# Patient Record
Sex: FEMALE | Race: BLACK OR AFRICAN AMERICAN | URBAN - METROPOLITAN AREA
[De-identification: names, ages, dates, MRNs, and addresses within clinical notes are randomized per-mention and may not be internally consistent; named-entity substitution may affect disease eponyms.]

---

## 2019-09-20 ENCOUNTER — INPATIENT (INPATIENT)
Facility: HOSPITAL | Age: 54
LOS: 4 days | Discharge: HOME | DRG: 418 | End: 2019-09-25
Attending: SURGERY | Admitting: SURGERY
Payer: COMMERCIAL

## 2019-09-20 VITALS — HEIGHT: 63 IN | WEIGHT: 270.07 LBS

## 2019-09-20 DIAGNOSIS — K81.0 ACUTE CHOLECYSTITIS: ICD-10-CM

## 2019-09-20 DIAGNOSIS — Z90.710 ACQUIRED ABSENCE OF BOTH CERVIX AND UTERUS: ICD-10-CM

## 2019-09-20 DIAGNOSIS — Z90.49 ACQUIRED ABSENCE OF OTHER SPECIFIED PARTS OF DIGESTIVE TRACT: Chronic | ICD-10-CM

## 2019-09-20 DIAGNOSIS — E66.9 OBESITY, UNSPECIFIED: ICD-10-CM

## 2019-09-20 DIAGNOSIS — K82.A1 GANGRENE OF GALLBLADDER IN CHOLECYSTITIS: ICD-10-CM

## 2019-09-20 DIAGNOSIS — D68.0 VON WILLEBRAND DISEASE: ICD-10-CM

## 2019-09-20 DIAGNOSIS — I10 ESSENTIAL (PRIMARY) HYPERTENSION: ICD-10-CM

## 2019-09-20 DIAGNOSIS — Z90.710 ACQUIRED ABSENCE OF BOTH CERVIX AND UTERUS: Chronic | ICD-10-CM

## 2019-09-20 LAB
ALBUMIN SERPL ELPH-MCNC: 3.9 G/DL — SIGNIFICANT CHANGE UP (ref 3.3–5)
ALP SERPL-CCNC: 78 U/L — SIGNIFICANT CHANGE UP (ref 40–120)
ALT FLD-CCNC: 18 U/L — SIGNIFICANT CHANGE UP (ref 12–78)
ANION GAP SERPL CALC-SCNC: 10 MMOL/L — SIGNIFICANT CHANGE UP (ref 5–17)
APTT BLD: 35 SEC — SIGNIFICANT CHANGE UP (ref 27.5–36.3)
AST SERPL-CCNC: 19 U/L — SIGNIFICANT CHANGE UP (ref 15–37)
BASOPHILS # BLD AUTO: 0.05 K/UL — SIGNIFICANT CHANGE UP (ref 0–0.2)
BASOPHILS NFR BLD AUTO: 0.6 % — SIGNIFICANT CHANGE UP (ref 0–2)
BILIRUB SERPL-MCNC: 0.5 MG/DL — SIGNIFICANT CHANGE UP (ref 0.2–1.2)
BUN SERPL-MCNC: 9 MG/DL — SIGNIFICANT CHANGE UP (ref 7–23)
CALCIUM SERPL-MCNC: 9.1 MG/DL — SIGNIFICANT CHANGE UP (ref 8.5–10.1)
CHLORIDE SERPL-SCNC: 100 MMOL/L — SIGNIFICANT CHANGE UP (ref 96–108)
CO2 SERPL-SCNC: 30 MMOL/L — SIGNIFICANT CHANGE UP (ref 22–31)
CREAT SERPL-MCNC: 0.83 MG/DL — SIGNIFICANT CHANGE UP (ref 0.5–1.3)
EOSINOPHIL # BLD AUTO: 0.11 K/UL — SIGNIFICANT CHANGE UP (ref 0–0.5)
EOSINOPHIL NFR BLD AUTO: 1.2 % — SIGNIFICANT CHANGE UP (ref 0–6)
GLUCOSE SERPL-MCNC: 90 MG/DL — SIGNIFICANT CHANGE UP (ref 70–99)
HCT VFR BLD CALC: 40.9 % — SIGNIFICANT CHANGE UP (ref 34.5–45)
HGB BLD-MCNC: 13.5 G/DL — SIGNIFICANT CHANGE UP (ref 11.5–15.5)
IMM GRANULOCYTES NFR BLD AUTO: 0.3 % — SIGNIFICANT CHANGE UP (ref 0–1.5)
INR BLD: 1.18 RATIO — HIGH (ref 0.88–1.16)
LIDOCAIN IGE QN: 59 U/L — LOW (ref 73–393)
LYMPHOCYTES # BLD AUTO: 1.87 K/UL — SIGNIFICANT CHANGE UP (ref 1–3.3)
LYMPHOCYTES # BLD AUTO: 21 % — SIGNIFICANT CHANGE UP (ref 13–44)
MCHC RBC-ENTMCNC: 26.6 PG — LOW (ref 27–34)
MCHC RBC-ENTMCNC: 33 GM/DL — SIGNIFICANT CHANGE UP (ref 32–36)
MCV RBC AUTO: 80.7 FL — SIGNIFICANT CHANGE UP (ref 80–100)
MONOCYTES # BLD AUTO: 0.81 K/UL — SIGNIFICANT CHANGE UP (ref 0–0.9)
MONOCYTES NFR BLD AUTO: 9.1 % — SIGNIFICANT CHANGE UP (ref 2–14)
NEUTROPHILS # BLD AUTO: 6.03 K/UL — SIGNIFICANT CHANGE UP (ref 1.8–7.4)
NEUTROPHILS NFR BLD AUTO: 67.8 % — SIGNIFICANT CHANGE UP (ref 43–77)
PLATELET # BLD AUTO: 272 K/UL — SIGNIFICANT CHANGE UP (ref 150–400)
POTASSIUM SERPL-MCNC: 3.4 MMOL/L — LOW (ref 3.5–5.3)
POTASSIUM SERPL-SCNC: 3.4 MMOL/L — LOW (ref 3.5–5.3)
PROT SERPL-MCNC: 8 GM/DL — SIGNIFICANT CHANGE UP (ref 6–8.3)
PROTHROM AB SERPL-ACNC: 13.2 SEC — HIGH (ref 10–12.9)
RBC # BLD: 5.07 M/UL — SIGNIFICANT CHANGE UP (ref 3.8–5.2)
RBC # FLD: 15 % — HIGH (ref 10.3–14.5)
SODIUM SERPL-SCNC: 140 MMOL/L — SIGNIFICANT CHANGE UP (ref 135–145)
WBC # BLD: 8.9 K/UL — SIGNIFICANT CHANGE UP (ref 3.8–10.5)
WBC # FLD AUTO: 8.9 K/UL — SIGNIFICANT CHANGE UP (ref 3.8–10.5)

## 2019-09-20 PROCEDURE — 85025 COMPLETE CBC W/AUTO DIFF WBC: CPT

## 2019-09-20 PROCEDURE — 85730 THROMBOPLASTIN TIME PARTIAL: CPT

## 2019-09-20 PROCEDURE — 85611 PROTHROMBIN TEST: CPT

## 2019-09-20 PROCEDURE — C9113: CPT

## 2019-09-20 PROCEDURE — 87075 CULTR BACTERIA EXCEPT BLOOD: CPT

## 2019-09-20 PROCEDURE — 85732 THROMBOPLASTIN TIME PARTIAL: CPT

## 2019-09-20 PROCEDURE — A9537: CPT

## 2019-09-20 PROCEDURE — 87070 CULTURE OTHR SPECIMN AEROBIC: CPT

## 2019-09-20 PROCEDURE — 85240 CLOT FACTOR VIII AHG 1 STAGE: CPT

## 2019-09-20 PROCEDURE — 94640 AIRWAY INHALATION TREATMENT: CPT

## 2019-09-20 PROCEDURE — 36415 COLL VENOUS BLD VENIPUNCTURE: CPT

## 2019-09-20 PROCEDURE — 86077 PHYS BLOOD BANK SERV XMATCH: CPT

## 2019-09-20 PROCEDURE — 83690 ASSAY OF LIPASE: CPT

## 2019-09-20 PROCEDURE — 85245 CLOT FACTOR VIII VW RISTOCTN: CPT

## 2019-09-20 PROCEDURE — C1889: CPT

## 2019-09-20 PROCEDURE — 86870 RBC ANTIBODY IDENTIFICATION: CPT

## 2019-09-20 PROCEDURE — 76705 ECHO EXAM OF ABDOMEN: CPT | Mod: 26

## 2019-09-20 PROCEDURE — 93005 ELECTROCARDIOGRAM TRACING: CPT

## 2019-09-20 PROCEDURE — 80076 HEPATIC FUNCTION PANEL: CPT

## 2019-09-20 PROCEDURE — 86905 BLOOD TYPING RBC ANTIGENS: CPT

## 2019-09-20 PROCEDURE — 85027 COMPLETE CBC AUTOMATED: CPT

## 2019-09-20 PROCEDURE — 80053 COMPREHEN METABOLIC PANEL: CPT

## 2019-09-20 PROCEDURE — 88304 TISSUE EXAM BY PATHOLOGIST: CPT

## 2019-09-20 PROCEDURE — 86803 HEPATITIS C AB TEST: CPT

## 2019-09-20 PROCEDURE — 99223 1ST HOSP IP/OBS HIGH 75: CPT

## 2019-09-20 PROCEDURE — 78226 HEPATOBILIARY SYSTEM IMAGING: CPT

## 2019-09-20 PROCEDURE — 85247 CLOT FACTOR VIII MULTIMETRIC: CPT

## 2019-09-20 PROCEDURE — 85610 PROTHROMBIN TIME: CPT

## 2019-09-20 PROCEDURE — 85670 THROMBIN TIME PLASMA: CPT

## 2019-09-20 PROCEDURE — 80048 BASIC METABOLIC PNL TOTAL CA: CPT

## 2019-09-20 PROCEDURE — 85246 CLOT FACTOR VIII VW ANTIGEN: CPT

## 2019-09-20 RX ORDER — MORPHINE SULFATE 50 MG/1
4 CAPSULE, EXTENDED RELEASE ORAL ONCE
Refills: 0 | Status: DISCONTINUED | OUTPATIENT
Start: 2019-09-20 | End: 2019-09-20

## 2019-09-20 RX ORDER — PIPERACILLIN AND TAZOBACTAM 4; .5 G/20ML; G/20ML
3.38 INJECTION, POWDER, LYOPHILIZED, FOR SOLUTION INTRAVENOUS EVERY 8 HOURS
Refills: 0 | Status: DISCONTINUED | OUTPATIENT
Start: 2019-09-20 | End: 2019-09-25

## 2019-09-20 RX ORDER — ALBUTEROL 90 UG/1
2 AEROSOL, METERED ORAL EVERY 6 HOURS
Refills: 0 | Status: DISCONTINUED | OUTPATIENT
Start: 2019-09-20 | End: 2019-09-25

## 2019-09-20 RX ORDER — ACETAMINOPHEN 500 MG
650 TABLET ORAL EVERY 6 HOURS
Refills: 0 | Status: DISCONTINUED | OUTPATIENT
Start: 2019-09-20 | End: 2019-09-23

## 2019-09-20 RX ORDER — ENALAPRIL MALEATE AND HYDROCHLOROTHIAZIDE 10; 25 MG/1; MG/1
1 TABLET ORAL
Qty: 0 | Refills: 0 | DISCHARGE

## 2019-09-20 RX ORDER — PANTOPRAZOLE SODIUM 20 MG/1
40 TABLET, DELAYED RELEASE ORAL DAILY
Refills: 0 | Status: DISCONTINUED | OUTPATIENT
Start: 2019-09-20 | End: 2019-09-24

## 2019-09-20 RX ORDER — PIPERACILLIN AND TAZOBACTAM 4; .5 G/20ML; G/20ML
3.38 INJECTION, POWDER, LYOPHILIZED, FOR SOLUTION INTRAVENOUS ONCE
Refills: 0 | Status: COMPLETED | OUTPATIENT
Start: 2019-09-20 | End: 2019-09-20

## 2019-09-20 RX ORDER — POTASSIUM CHLORIDE 20 MEQ
40 PACKET (EA) ORAL ONCE
Refills: 0 | Status: COMPLETED | OUTPATIENT
Start: 2019-09-20 | End: 2019-09-20

## 2019-09-20 RX ORDER — INFLUENZA VIRUS VACCINE 15; 15; 15; 15 UG/.5ML; UG/.5ML; UG/.5ML; UG/.5ML
0.5 SUSPENSION INTRAMUSCULAR ONCE
Refills: 0 | Status: COMPLETED | OUTPATIENT
Start: 2019-09-20 | End: 2019-09-20

## 2019-09-20 RX ORDER — HYDROMORPHONE HYDROCHLORIDE 2 MG/ML
1 INJECTION INTRAMUSCULAR; INTRAVENOUS; SUBCUTANEOUS EVERY 4 HOURS
Refills: 0 | Status: DISCONTINUED | OUTPATIENT
Start: 2019-09-20 | End: 2019-09-24

## 2019-09-20 RX ORDER — OXYCODONE AND ACETAMINOPHEN 5; 325 MG/1; MG/1
1 TABLET ORAL EVERY 4 HOURS
Refills: 0 | Status: DISCONTINUED | OUTPATIENT
Start: 2019-09-20 | End: 2019-09-20

## 2019-09-20 RX ORDER — SODIUM CHLORIDE 9 MG/ML
1000 INJECTION INTRAMUSCULAR; INTRAVENOUS; SUBCUTANEOUS
Refills: 0 | Status: DISCONTINUED | OUTPATIENT
Start: 2019-09-20 | End: 2019-09-22

## 2019-09-20 RX ORDER — SODIUM CHLORIDE 9 MG/ML
1000 INJECTION INTRAMUSCULAR; INTRAVENOUS; SUBCUTANEOUS ONCE
Refills: 0 | Status: COMPLETED | OUTPATIENT
Start: 2019-09-20 | End: 2019-09-20

## 2019-09-20 RX ORDER — ONDANSETRON 8 MG/1
4 TABLET, FILM COATED ORAL ONCE
Refills: 0 | Status: COMPLETED | OUTPATIENT
Start: 2019-09-20 | End: 2019-09-20

## 2019-09-20 RX ORDER — OXYCODONE HYDROCHLORIDE 5 MG/1
5 TABLET ORAL EVERY 6 HOURS
Refills: 0 | Status: DISCONTINUED | OUTPATIENT
Start: 2019-09-20 | End: 2019-09-23

## 2019-09-20 RX ORDER — ONDANSETRON 8 MG/1
4 TABLET, FILM COATED ORAL EVERY 6 HOURS
Refills: 0 | Status: DISCONTINUED | OUTPATIENT
Start: 2019-09-20 | End: 2019-09-25

## 2019-09-20 RX ORDER — MORPHINE SULFATE 50 MG/1
2 CAPSULE, EXTENDED RELEASE ORAL EVERY 4 HOURS
Refills: 0 | Status: DISCONTINUED | OUTPATIENT
Start: 2019-09-20 | End: 2019-09-20

## 2019-09-20 RX ADMIN — OXYCODONE HYDROCHLORIDE 5 MILLIGRAM(S): 5 TABLET ORAL at 20:25

## 2019-09-20 RX ADMIN — MORPHINE SULFATE 4 MILLIGRAM(S): 50 CAPSULE, EXTENDED RELEASE ORAL at 20:14

## 2019-09-20 RX ADMIN — MORPHINE SULFATE 4 MILLIGRAM(S): 50 CAPSULE, EXTENDED RELEASE ORAL at 17:14

## 2019-09-20 RX ADMIN — SODIUM CHLORIDE 1000 MILLILITER(S): 9 INJECTION INTRAMUSCULAR; INTRAVENOUS; SUBCUTANEOUS at 17:14

## 2019-09-20 RX ADMIN — ONDANSETRON 4 MILLIGRAM(S): 8 TABLET, FILM COATED ORAL at 17:15

## 2019-09-20 RX ADMIN — SODIUM CHLORIDE 100 MILLILITER(S): 9 INJECTION INTRAMUSCULAR; INTRAVENOUS; SUBCUTANEOUS at 20:21

## 2019-09-20 RX ADMIN — Medication 40 MILLIEQUIVALENT(S): at 20:21

## 2019-09-20 RX ADMIN — PIPERACILLIN AND TAZOBACTAM 200 GRAM(S): 4; .5 INJECTION, POWDER, LYOPHILIZED, FOR SOLUTION INTRAVENOUS at 20:21

## 2019-09-20 NOTE — H&P ADULT - HISTORY OF PRESENT ILLNESS
Pt seen and examined at bedside with chaperone. Pt is AAOx3, pt in no acute distress. Pt has c/o subjective fever on 9/16/19, with subsequent development of upper abd pain on 9/18/19. Pain is intermittent, sharp, non radiating, associated with nausea and non bloody emesis. Pt denied c/o fever, chills, chest pain, SOB, extremity pain or dysfunction, hemoptysis, hematemesis, hematuria, hematochexia, headache, diplopia, vertigo, dizzyness. Pt states tolerating diet, (+) void, (+) ambulation, (+) bowel function

## 2019-09-20 NOTE — ED ADULT NURSE NOTE - OBJECTIVE STATEMENT
presents to ed with abdominal pain beginning wednesday and vomiting on wednesday. patient c/o fevers on monday that have subsided. patient has not eaten or drank anything.

## 2019-09-20 NOTE — H&P ADULT - ASSESSMENT
HIDA scan  Hematology consult A/P:  R/O acute cholecystitis  Medical comorbidities of HTN, von Willebrand disease  Antibiotics  NPO, IV hydration  GI/DVT prophylaxis  Pain control  Incentive spirometry  Serial abd exams  F/U labs, HIDA  Pt will be monitored for signs of evolution/resolution of pathology and surgical intervention as required and warranted  Pt aware of and agrees with all of the above    Hospitalist consult for medical management/clearance  Hematology consult for vWD treatment modality for eventuality of surgery

## 2019-09-20 NOTE — ED STATDOCS - PROGRESS NOTE DETAILS
Pt is a 55 y/o F, with PMHx of HTN, s/p hysterectomy, presenting to the ED with c/o abdominal pain for the last three days. Pt reports mid epigastric abd pain and right upper abd pain that has been intermittent but worsening since onset, and worse with deep breath. She reports experiencing a "heat wave" three days ago where she felt flushed and began having multiple episodes of NBNB emesis. Denies documented fever, diarrhea, CP, SOB, and urinary sxs. No sick contact. Pt went to urgent care regarding sxs and sent to ED further eval.  PE: (+) RUQ pain and epigastric tenderness plan: ivfs, labs, pain meds, imaging and reeval. -Melinda White PA-C spoke to surgery they will consult in ed. pt aware of labs and imaging results. -Melinda White PA-C spoke to surgery resident will admit for HIDA tommorrow. pt aware and agrees with plan. -Melinda White PA-C

## 2019-09-20 NOTE — ED STATDOCS - CLINICAL SUMMARY MEDICAL DECISION MAKING FREE TEXT BOX
53 yo F with RUQ abd pain and vomiting for 3-4 days. Subjective fever at home. Rule out acute cholecystitis.

## 2019-09-20 NOTE — ED STATDOCS - NS_ ATTENDINGSCRIBEDETAILS _ED_A_ED_FT
Skylar Hercules MD: The history, relevant review of systems, past medical and surgical history, medical decision making, and physical examination was documented by the scribe in my presence and I attest to the accuracy of the documentation.

## 2019-09-20 NOTE — H&P ADULT - NSHPLABSRESULTS_GEN_ALL_CORE
13.5   8.90  )-----------( 272      ( 20 Sep 2019 17:11 )             40.9     CBC Full  -  ( 20 Sep 2019 17:11 )  WBC Count : 8.90 K/uL  RBC Count : 5.07 M/uL  Hemoglobin : 13.5 g/dL  Hematocrit : 40.9 %  Platelet Count - Automated : 272 K/uL  Mean Cell Volume : 80.7 fl  Mean Cell Hemoglobin : 26.6 pg  Mean Cell Hemoglobin Concentration : 33.0 gm/dL  Auto Neutrophil # : 6.03 K/uL  Auto Lymphocyte # : 1.87 K/uL  Auto Monocyte # : 0.81 K/uL  Auto Eosinophil # : 0.11 K/uL  Auto Basophil # : 0.05 K/uL  Auto Neutrophil % : 67.8 %  Auto Lymphocyte % : 21.0 %  Auto Monocyte % : 9.1 %  Auto Eosinophil % : 1.2 %  Auto Basophil % : 0.6 %    09-20    140  |  100  |  9   ----------------------------<  90  3.4<L>   |  30  |  0.83    Ca    9.1      20 Sep 2019 17:11    TPro  8.0  /  Alb  3.9  /  TBili  0.5  /  DBili  x   /  AST  19  /  ALT  18  /  AlkPhos  78  09-20    LIVER FUNCTIONS - ( 20 Sep 2019 17:11 )  Alb: 3.9 g/dL / Pro: 8.0 gm/dL / ALK PHOS: 78 U/L / ALT: 18 U/L / AST: 19 U/L / GGT: x           PT/INR - ( 20 Sep 2019 17:11 )   PT: 13.2 sec;   INR: 1.18 ratio         PTT - ( 20 Sep 2019 17:11 )  PTT:35.0 sec    Radiology:    Pending HIDA    EXAM:  US ABDOMEN LIMITED                          PROCEDURE DATE:  09/20/2019      INTERPRETATION:  CLINICAL INFORMATION: Right upper quadrant abdominal pain    COMPARISON: None available.    IMPRESSION:     Findings suspicious for acute cholecystitis

## 2019-09-20 NOTE — ED STATDOCS - OBJECTIVE STATEMENT
Pt is a 55 y/o F, with PMHx of HTN, s/p hysterectomy, presenting to the ED with c/o abdominal pain for the last three days. Pt reports mid epigastric abd pain and right upper abd pain that has been intermittent but worsening since onset, and worse with deep breath. She reports experiencing a "heat wave" three days ago where she felt flushed and began having multiple episodes of NBNB emesis. Denies documented fever, diarrhea, CP, SOB, and urinary sxs. No sick contact. Pt went to urgent care regarding sxs and sent to ED further eval.   Dora- PMD

## 2019-09-20 NOTE — H&P ADULT - NSHPPHYSICALEXAM_GEN_ALL_CORE
Pt is AAOx3  Pt in no acute distress  Neuro: CNII-XII grossly intact   Psych: normal affect  HEENT: Normocephalic, atraumatic, CHELSEA, EOM wnl  Neck: No crepitus, no ecchymosis, no hematoma, to exam, no JVD, no tracheal deviation  Cardiovascular: S1S2 Present  Respiratory: Respiratory Effort normal; no wheezes, rales or rhonchi to exam, CTAB  ABD: obese habitus, bowel sounds (+), soft, non distended, no rebound, no guarding, no rigidity, no skin changes to exam. (+) right upper quadrant and epigastric tenderness to exam  Musculoskeletal: All digits are warm and well perfused. Pt demonstrates grossly intact sensoromotor function. Pt has good capillary refill to digits, no calf edema or tenderness to exam.  Skin: no jaundice or icteric sclera to exam b/l, no skin changes to exam    Vital Signs Last 24 Hrs  T(C): 37.4 (20 Sep 2019 20:13), Max: 37.4 (20 Sep 2019 20:13)  T(F): 99.4 (20 Sep 2019 20:13), Max: 99.4 (20 Sep 2019 20:13)  HR: 83 (20 Sep 2019 20:13) (82 - 83)  BP: 123/73 (20 Sep 2019 20:13) (123/73 - 130/83)  BP(mean): --  RR: 16 (20 Sep 2019 20:13) (16 - 17)  SpO2: 99% (20 Sep 2019 20:13) (96% - 99%)

## 2019-09-21 DIAGNOSIS — D68.0 VON WILLEBRAND DISEASE: ICD-10-CM

## 2019-09-21 LAB
ALBUMIN SERPL ELPH-MCNC: 3.1 G/DL — LOW (ref 3.3–5)
ALP SERPL-CCNC: 62 U/L — SIGNIFICANT CHANGE UP (ref 40–120)
ALT FLD-CCNC: 15 U/L — SIGNIFICANT CHANGE UP (ref 12–78)
ANION GAP SERPL CALC-SCNC: 5 MMOL/L — SIGNIFICANT CHANGE UP (ref 5–17)
APTT BLD: 32.9 SEC — SIGNIFICANT CHANGE UP (ref 27.5–36.3)
AST SERPL-CCNC: 10 U/L — LOW (ref 15–37)
BASOPHILS # BLD AUTO: 0.03 K/UL — SIGNIFICANT CHANGE UP (ref 0–0.2)
BASOPHILS NFR BLD AUTO: 0.4 % — SIGNIFICANT CHANGE UP (ref 0–2)
BILIRUB SERPL-MCNC: 0.6 MG/DL — SIGNIFICANT CHANGE UP (ref 0.2–1.2)
BUN SERPL-MCNC: 9 MG/DL — SIGNIFICANT CHANGE UP (ref 7–23)
CALCIUM SERPL-MCNC: 8.4 MG/DL — LOW (ref 8.5–10.1)
CHLORIDE SERPL-SCNC: 108 MMOL/L — SIGNIFICANT CHANGE UP (ref 96–108)
CO2 SERPL-SCNC: 29 MMOL/L — SIGNIFICANT CHANGE UP (ref 22–31)
CREAT SERPL-MCNC: 0.71 MG/DL — SIGNIFICANT CHANGE UP (ref 0.5–1.3)
EOSINOPHIL # BLD AUTO: 0.12 K/UL — SIGNIFICANT CHANGE UP (ref 0–0.5)
EOSINOPHIL NFR BLD AUTO: 1.5 % — SIGNIFICANT CHANGE UP (ref 0–6)
GLUCOSE SERPL-MCNC: 91 MG/DL — SIGNIFICANT CHANGE UP (ref 70–99)
HCT VFR BLD CALC: 34.7 % — SIGNIFICANT CHANGE UP (ref 34.5–45)
HCV AB S/CO SERPL IA: 0.19 S/CO — SIGNIFICANT CHANGE UP (ref 0–0.99)
HCV AB SERPL-IMP: SIGNIFICANT CHANGE UP
HGB BLD-MCNC: 11.6 G/DL — SIGNIFICANT CHANGE UP (ref 11.5–15.5)
IMM GRANULOCYTES NFR BLD AUTO: 0.4 % — SIGNIFICANT CHANGE UP (ref 0–1.5)
INR BLD: 1.24 RATIO — HIGH (ref 0.88–1.16)
INR BLD: 1.3 RATIO — HIGH (ref 0.88–1.16)
LYMPHOCYTES # BLD AUTO: 1.76 K/UL — SIGNIFICANT CHANGE UP (ref 1–3.3)
LYMPHOCYTES # BLD AUTO: 22.4 % — SIGNIFICANT CHANGE UP (ref 13–44)
MCHC RBC-ENTMCNC: 27 PG — SIGNIFICANT CHANGE UP (ref 27–34)
MCHC RBC-ENTMCNC: 33.4 GM/DL — SIGNIFICANT CHANGE UP (ref 32–36)
MCV RBC AUTO: 80.9 FL — SIGNIFICANT CHANGE UP (ref 80–100)
MONOCYTES # BLD AUTO: 0.84 K/UL — SIGNIFICANT CHANGE UP (ref 0–0.9)
MONOCYTES NFR BLD AUTO: 10.7 % — SIGNIFICANT CHANGE UP (ref 2–14)
NEUTROPHILS # BLD AUTO: 5.06 K/UL — SIGNIFICANT CHANGE UP (ref 1.8–7.4)
NEUTROPHILS NFR BLD AUTO: 64.6 % — SIGNIFICANT CHANGE UP (ref 43–77)
PLATELET # BLD AUTO: 212 K/UL — SIGNIFICANT CHANGE UP (ref 150–400)
POTASSIUM SERPL-MCNC: 3.3 MMOL/L — LOW (ref 3.5–5.3)
POTASSIUM SERPL-SCNC: 3.3 MMOL/L — LOW (ref 3.5–5.3)
PROT SERPL-MCNC: 6.5 GM/DL — SIGNIFICANT CHANGE UP (ref 6–8.3)
PROTHROM AB SERPL-ACNC: 13.8 SEC — HIGH (ref 10–12.9)
PROTHROM AB SERPL-ACNC: 14.6 SEC — HIGH (ref 10–12.9)
RBC # BLD: 4.29 M/UL — SIGNIFICANT CHANGE UP (ref 3.8–5.2)
RBC # FLD: 14.9 % — HIGH (ref 10.3–14.5)
SODIUM SERPL-SCNC: 142 MMOL/L — SIGNIFICANT CHANGE UP (ref 135–145)
WBC # BLD: 7.84 K/UL — SIGNIFICANT CHANGE UP (ref 3.8–10.5)
WBC # FLD AUTO: 7.84 K/UL — SIGNIFICANT CHANGE UP (ref 3.8–10.5)

## 2019-09-21 PROCEDURE — 93010 ELECTROCARDIOGRAM REPORT: CPT

## 2019-09-21 PROCEDURE — 78226 HEPATOBILIARY SYSTEM IMAGING: CPT | Mod: 26

## 2019-09-21 PROCEDURE — 99254 IP/OBS CNSLTJ NEW/EST MOD 60: CPT

## 2019-09-21 PROCEDURE — 99232 SBSQ HOSP IP/OBS MODERATE 35: CPT

## 2019-09-21 RX ORDER — POTASSIUM CHLORIDE 20 MEQ
20 PACKET (EA) ORAL
Refills: 0 | Status: COMPLETED | OUTPATIENT
Start: 2019-09-21 | End: 2019-09-21

## 2019-09-21 RX ORDER — MORPHINE SULFATE 50 MG/1
4 CAPSULE, EXTENDED RELEASE ORAL ONCE
Refills: 0 | Status: DISCONTINUED | OUTPATIENT
Start: 2019-09-21 | End: 2019-09-21

## 2019-09-21 RX ADMIN — Medication 20 MILLIEQUIVALENT(S): at 16:45

## 2019-09-21 RX ADMIN — Medication 20 MILLIEQUIVALENT(S): at 18:05

## 2019-09-21 RX ADMIN — PIPERACILLIN AND TAZOBACTAM 25 GRAM(S): 4; .5 INJECTION, POWDER, LYOPHILIZED, FOR SOLUTION INTRAVENOUS at 21:19

## 2019-09-21 RX ADMIN — MORPHINE SULFATE 4 MILLIGRAM(S): 50 CAPSULE, EXTENDED RELEASE ORAL at 11:15

## 2019-09-21 RX ADMIN — OXYCODONE HYDROCHLORIDE 5 MILLIGRAM(S): 5 TABLET ORAL at 20:15

## 2019-09-21 RX ADMIN — PANTOPRAZOLE SODIUM 40 MILLIGRAM(S): 20 TABLET, DELAYED RELEASE ORAL at 08:15

## 2019-09-21 RX ADMIN — PIPERACILLIN AND TAZOBACTAM 25 GRAM(S): 4; .5 INJECTION, POWDER, LYOPHILIZED, FOR SOLUTION INTRAVENOUS at 13:09

## 2019-09-21 RX ADMIN — SODIUM CHLORIDE 100 MILLILITER(S): 9 INJECTION INTRAMUSCULAR; INTRAVENOUS; SUBCUTANEOUS at 15:50

## 2019-09-21 RX ADMIN — MORPHINE SULFATE 4 MILLIGRAM(S): 50 CAPSULE, EXTENDED RELEASE ORAL at 11:01

## 2019-09-21 RX ADMIN — OXYCODONE HYDROCHLORIDE 5 MILLIGRAM(S): 5 TABLET ORAL at 08:45

## 2019-09-21 RX ADMIN — Medication 650 MILLIGRAM(S): at 22:04

## 2019-09-21 RX ADMIN — OXYCODONE HYDROCHLORIDE 5 MILLIGRAM(S): 5 TABLET ORAL at 21:04

## 2019-09-21 RX ADMIN — Medication 650 MILLIGRAM(S): at 21:19

## 2019-09-21 RX ADMIN — OXYCODONE HYDROCHLORIDE 5 MILLIGRAM(S): 5 TABLET ORAL at 08:15

## 2019-09-21 RX ADMIN — SODIUM CHLORIDE 100 MILLILITER(S): 9 INJECTION INTRAMUSCULAR; INTRAVENOUS; SUBCUTANEOUS at 05:06

## 2019-09-21 RX ADMIN — PIPERACILLIN AND TAZOBACTAM 25 GRAM(S): 4; .5 INJECTION, POWDER, LYOPHILIZED, FOR SOLUTION INTRAVENOUS at 05:06

## 2019-09-21 NOTE — CONSULT NOTE ADULT - SUBJECTIVE AND OBJECTIVE BOX
hpi: 54y female w/ pmh htn admitted with abdominal pain. Patient on surgery service, and plan for possible cholecystectomy.   Hematology consulted as patient reports history of von willibrands disease.   On further talking to patient she states that she was diagnosed with von willibrands disease about 7 years ago. Work up was actually initiated at the time because her son excessive nose bleeds and was diagnosed with vWD.  She had a few surgeries prior to diagnosis ( fibroid removal as well as some breast surgeries) with no excessive bleeding.   Post diagnosis of vwd she had a hysterectomy and was treated with 'something' but doesnt quite remember what.   Pateints medical care has been at [facility] in [place].   She has a h/o heavy menstrual bleeding  Patient has a h/o 2 blood transfusions due to heavy periods. No h/o transfusions related to surgeries  Currently patient denies chest pain , sob, palp- no hx CAD, no DM.  Able to ambulate w/o cp or sob.     PAST MEDICAL & SURGICAL HISTORY:  htn, ?von willebrand disease  KAREN, appendectomy  Fibroids, Breast surgery    FAMILY HISTORY: son with vWD  Social History: no etoh or tobacco use

## 2019-09-21 NOTE — CHART NOTE - NSCHARTNOTEFT_GEN_A_CORE
Patient with h/o von WIllibrands Disease. Unclear of von willibrands profile/ records which are not in the system   Order von Willibrands antigen, von willibrands activity, von Willibrands Multimer, Factor 8 activity level  Spoke to surgery resident (Licha) about the same.   Will follow up levels and provide recommendations

## 2019-09-21 NOTE — CONSULT NOTE ADULT - ASSESSMENT
Medical comorbidities of HTN, von Willebrand disease  Antibiotics  NPO, IV hydration  GI/DVT prophylaxis  Pain control  Incentive spirometry  Serial abd exams  F/U labs, HIDA  Pt will be monitored for signs of evolution/resolution of pathology and surgical intervention as required and warranted  Pt aware of and agrees with all of the above    Hospitalist consult for medical management/clearance  Hematology consult for vWD treatment modality for eventuality of surgery

## 2019-09-21 NOTE — CONSULT NOTE ADULT - PROBLEM SELECTOR RECOMMENDATION 9
Unclear of type of von willibrands disease   - Pre/ ernie op treatment will vary based on type, and severity. Ty 1 , 2 or 3  - Order vWAntigen, vWF activity, vW multimers and F8 levels. Will put in further recommendations based on labs.   - In addition patient with slightly elevated PT at 13.2 sec  ( range upto 12.9)  on 9/20 and 14.6 s on 9/21. Most likely nutritional , however order Mixing studies to confirm. Patient not on any anticoagulants.

## 2019-09-21 NOTE — PROGRESS NOTE ADULT - ASSESSMENT
A/P:  R/O acute cholecystitis  Medical comorbidities of HTN, von Willebrand disease  Antibiotics  NPO, IV hydration  GI/DVT prophylaxis  Pain control  Incentive spirometry  Serial abd exams  F/U labs, HIDA  Pt will be monitored for signs of evolution/resolution of pathology and surgical intervention as required and warranted  Pt aware of and agrees with all of the above    Hospitalist consult for medical management/clearance  Hematology consult for vWD treatment modality for eventuality of surgery

## 2019-09-21 NOTE — PROGRESS NOTE ADULT - SUBJECTIVE AND OBJECTIVE BOX
CC:Patient is a 54y old  Female who presents with a chief complaint of Acute Cholecystitis (20 Sep 2019 19:40)      Subjective:  Pt seen and examined at bedside with chaperone. Pt is AAOx3, pt in no acute distress. Pt states tolerated upper abd pain with meds. Pt denied c/o fever, chills, chest pain, SOB, N/V/D, extremity pain or dysfunction, hemoptysis, hematemesis, hematuria, hematochexia, headache, diplopia, vertigo, dizzyness. Pt states (+) void, (+) ambulation, (+) bowel function    ROS:  otherwise as abovementioned ROS    Vital Signs Last 24 Hrs  T(C): 36.8 (21 Sep 2019 05:34), Max: 37.4 (20 Sep 2019 20:13)  T(F): 98.2 (21 Sep 2019 05:34), Max: 99.4 (20 Sep 2019 20:13)  HR: 69 (21 Sep 2019 05:34) (69 - 83)  BP: 106/63 (21 Sep 2019 05:34) (106/63 - 130/83)  BP(mean): --  RR: 17 (20 Sep 2019 21:00) (16 - 17)  SpO2: 98% (21 Sep 2019 05:34) (96% - 100%)    Labs:                                13.5   8.90  )-----------( 272      ( 20 Sep 2019 17:11 )             40.9     CBC Full  -  ( 20 Sep 2019 17:11 )  WBC Count : 8.90 K/uL  RBC Count : 5.07 M/uL  Hemoglobin : 13.5 g/dL  Hematocrit : 40.9 %  Platelet Count - Automated : 272 K/uL  Mean Cell Volume : 80.7 fl  Mean Cell Hemoglobin : 26.6 pg  Mean Cell Hemoglobin Concentration : 33.0 gm/dL  Auto Neutrophil # : 6.03 K/uL  Auto Lymphocyte # : 1.87 K/uL  Auto Monocyte # : 0.81 K/uL  Auto Eosinophil # : 0.11 K/uL  Auto Basophil # : 0.05 K/uL  Auto Neutrophil % : 67.8 %  Auto Lymphocyte % : 21.0 %  Auto Monocyte % : 9.1 %  Auto Eosinophil % : 1.2 %  Auto Basophil % : 0.6 %    09-20    140  |  100  |  9   ----------------------------<  90  3.4<L>   |  30  |  0.83    Ca    9.1      20 Sep 2019 17:11    TPro  8.0  /  Alb  3.9  /  TBili  0.5  /  DBili  x   /  AST  19  /  ALT  18  /  AlkPhos  78  09-20    LIVER FUNCTIONS - ( 20 Sep 2019 17:11 )  Alb: 3.9 g/dL / Pro: 8.0 gm/dL / ALK PHOS: 78 U/L / ALT: 18 U/L / AST: 19 U/L / GGT: x           PT/INR - ( 20 Sep 2019 17:11 )   PT: 13.2 sec;   INR: 1.18 ratio         PTT - ( 20 Sep 2019 17:11 )  PTT:35.0 sec      Meds:  acetaminophen   Tablet .. 650 milliGRAM(s) Oral every 6 hours PRN  ALBUTerol    90 MICROgram(s) HFA Inhaler 2 Puff(s) Inhalation every 6 hours PRN  enalapril 10 milliGRAM(s) Oral daily  HYDROmorphone  Injectable 1 milliGRAM(s) IV Push every 4 hours PRN  influenza   Vaccine 0.5 milliLiter(s) IntraMuscular once  ondansetron Injectable 4 milliGRAM(s) IV Push every 6 hours PRN  oxyCODONE    IR 5 milliGRAM(s) Oral every 6 hours PRN  pantoprazole  Injectable 40 milliGRAM(s) IV Push daily  piperacillin/tazobactam IVPB.. 3.375 Gram(s) IV Intermittent every 8 hours  sodium chloride 0.9%. 1000 milliLiter(s) IV Continuous <Continuous>      Radiology:  Pending HIDA    Physical exam:  Pt is aaox3  Pt in no acute distress  Psych: normal affect  Resp: CTAB  CVS: S1S2(+)  ABD: obese habitus, bowel sounds (+), soft, non distended, no rebound, no guarding, no rigidity, no skin changes to exam. (+) mild right upper quadrant tenderness to exam  EXT: no calf tenderness or edema to exam b/l, on VTE prophylaxis  Skin: no adverse skin changes to exam, no jaundice or icteric sclera b/l

## 2019-09-21 NOTE — CONSULT NOTE ADULT - SUBJECTIVE AND OBJECTIVE BOX
cc: abdominal pain  hpi: 54y female w/ pmh htn presents w/ abdominal pain for few days.  Points to epigastric region stating it's mostly there but sometimes radiates to ruq, worse w/ eating and associated w/ nausea, vomiting.  Denies fevers, no diarrhea.  Regular bm, good uop.   Pain significantly improved since arrival in ED.  Denies chest pain , sob, palp- no hx CAD, no DM.  Able to ambulate w/o cp or sob.     PAST MEDICAL & SURGICAL HISTORY:  htn, ?von willebrand disease  KAREN, appendectomy    FAMILY HISTORY: denies   Social History: no etoh or tobacco use    Vital Signs Last 24 Hrs  T(C): 36.7 (21 Sep 2019 12:37), Max: 37.4 (20 Sep 2019 20:13)  T(F): 98 (21 Sep 2019 12:37), Max: 99.4 (20 Sep 2019 20:13)  HR: 65 (21 Sep 2019 12:37) (65 - 83)  BP: 138/74 (21 Sep 2019 12:37) (106/63 - 138/74)  BP(mean): --  RR: 17 (21 Sep 2019 12:37) (16 - 17)  SpO2: 99% (21 Sep 2019 12:37) (96% - 100%)        PHYSICAL EXAM:  General: NAD, comfortable- seated at bedside  Neuro: AAOx3, no focal deficits  HEENT: NCAT, EOMI  Neck: Soft and supple  Respiratory: CTA b/l, no w/r/r  Cardiovascular: S1 and S2, RRR  Gastrointestinal: soft; epigastric ttp and mild ruq ttp; no rebound tenderness or guarding  Extremities: No edema  Vascular: 2+ peripheral pulses  Musculoskeletal: 5/5 strength b/l UE and LE        LABS: All Labs Reviewed:                        11.6   7.84  )-----------( 212      ( 21 Sep 2019 07:43 )             34.7     09-21    142  |  108  |  9   ----------------------------<  91  3.3<L>   |  29  |  0.71    Ca    8.4<L>      21 Sep 2019 07:43    TPro  6.5  /  Alb  3.1<L>  /  TBili  0.6  /  DBili  x   /  AST  10<L>  /  ALT  15  /  AlkPhos  62  09-21    PT/INR - ( 21 Sep 2019 07:43 )   PT: 14.6 sec;   INR: 1.30 ratio         PTT - ( 21 Sep 2019 07:43 )  PTT:32.9 sec    < from: NM Hepatobiliary Imaging (09.21.19 @ 12:29) >  IMPRESSION: Abnormal morphine-augmented hepatobiliary scan: acute   cholecystitis.    Dr. Gavin, surgical resident was informed of these results by Dr. Patel with read back at about 12:30 pm on September 21, 2019.    < end of copied text >        MEDICATIONS  (STANDING):  enalapril 10 milliGRAM(s) Oral daily  influenza   Vaccine 0.5 milliLiter(s) IntraMuscular once  pantoprazole  Injectable 40 milliGRAM(s) IV Push daily  piperacillin/tazobactam IVPB.. 3.375 Gram(s) IV Intermittent every 8 hours  sodium chloride 0.9%. 1000 milliLiter(s) (100 mL/Hr) IV Continuous <Continuous>    MEDICATIONS  (PRN):  acetaminophen   Tablet .. 650 milliGRAM(s) Oral every 6 hours PRN Temp greater or equal to 38C (100.4F), Mild Pain (1 - 3)  ALBUTerol    90 MICROgram(s) HFA Inhaler 2 Puff(s) Inhalation every 6 hours PRN for shortness of breath and/or wheezing  HYDROmorphone  Injectable 1 milliGRAM(s) IV Push every 4 hours PRN Severe Pain (7 - 10)  ondansetron Injectable 4 milliGRAM(s) IV Push every 6 hours PRN Nausea  oxyCODONE    IR 5 milliGRAM(s) Oral every 6 hours PRN Moderate Pain (4 - 6)

## 2019-09-21 NOTE — CHART NOTE - NSCHARTNOTEFT_GEN_A_CORE
Was notified that Von Willibrands levels/ Profile is not run over the weekend.     If surgery can be delayed till Monday when labs are done, recommendations will be made then based on the type of vWD.      However if surgery urgently needs to be done prior to Monday, contact hematology and I will make prophylactic recommendations.

## 2019-09-21 NOTE — CONSULT NOTE ADULT - ASSESSMENT
1. acute cholecystitis  - management as per Surgery  - ivf, iv zosyn, pain control  - ekg pending-  - replete K     2. htn   - continue enalapril    3. possible hx von willebrands disease  - Heme/Onc following - labs ordered     4. dvt px  - pt ambulating     Thank you for this consult.  We will follow with you. 1. acute cholecystitis  - management as per Surgery  - ivf, iv zosyn, pain control  - Replete K.  ekg reviewed.    Patient has no contraindications for surgery.     2. htn   - continue enalapril    3. possible hx von willebrands disease  - Heme/Onc following - labs ordered     4. dvt px  - pt ambulating       Thank you for this consult.  We will follow with you.

## 2019-09-22 LAB
ALBUMIN SERPL ELPH-MCNC: 2.9 G/DL — LOW (ref 3.3–5)
ALP SERPL-CCNC: 67 U/L — SIGNIFICANT CHANGE UP (ref 40–120)
ALT FLD-CCNC: 17 U/L — SIGNIFICANT CHANGE UP (ref 12–78)
ANION GAP SERPL CALC-SCNC: 8 MMOL/L — SIGNIFICANT CHANGE UP (ref 5–17)
APTT BLD: 33.3 SEC — SIGNIFICANT CHANGE UP (ref 27.5–36.3)
APTT BLD: SIGNIFICANT CHANGE UP SEC (ref 27.5–37.4)
AST SERPL-CCNC: 11 U/L — LOW (ref 15–37)
BILIRUB DIRECT SERPL-MCNC: 0.1 MG/DL — SIGNIFICANT CHANGE UP (ref 0–0.2)
BILIRUB INDIRECT FLD-MCNC: 0.4 MG/DL — SIGNIFICANT CHANGE UP (ref 0.2–1)
BILIRUB SERPL-MCNC: 0.5 MG/DL — SIGNIFICANT CHANGE UP (ref 0.2–1.2)
BUN SERPL-MCNC: 8 MG/DL — SIGNIFICANT CHANGE UP (ref 7–23)
CALCIUM SERPL-MCNC: 8.6 MG/DL — SIGNIFICANT CHANGE UP (ref 8.5–10.1)
CHLORIDE SERPL-SCNC: 108 MMOL/L — SIGNIFICANT CHANGE UP (ref 96–108)
CO2 SERPL-SCNC: 26 MMOL/L — SIGNIFICANT CHANGE UP (ref 22–31)
CREAT SERPL-MCNC: 0.82 MG/DL — SIGNIFICANT CHANGE UP (ref 0.5–1.3)
FIBRINOGEN PPP-MCNC: 613 MG/DL — HIGH (ref 320–500)
GLUCOSE SERPL-MCNC: 95 MG/DL — SIGNIFICANT CHANGE UP (ref 70–99)
HCT VFR BLD CALC: 34 % — LOW (ref 34.5–45)
HGB BLD-MCNC: 11.2 G/DL — LOW (ref 11.5–15.5)
LIDOCAIN IGE QN: 44 U/L — LOW (ref 73–393)
MCHC RBC-ENTMCNC: 27 PG — SIGNIFICANT CHANGE UP (ref 27–34)
MCHC RBC-ENTMCNC: 32.9 GM/DL — SIGNIFICANT CHANGE UP (ref 32–36)
MCV RBC AUTO: 81.9 FL — SIGNIFICANT CHANGE UP (ref 80–100)
PLATELET # BLD AUTO: 205 K/UL — SIGNIFICANT CHANGE UP (ref 150–400)
POTASSIUM SERPL-MCNC: 3.8 MMOL/L — SIGNIFICANT CHANGE UP (ref 3.5–5.3)
POTASSIUM SERPL-SCNC: 3.8 MMOL/L — SIGNIFICANT CHANGE UP (ref 3.5–5.3)
PROT SERPL-MCNC: 6.7 GM/DL — SIGNIFICANT CHANGE UP (ref 6–8.3)
PT 100%: 13.2 SEC — HIGH (ref 10–13.1)
PT 50/50: 12.1 SEC — SIGNIFICANT CHANGE UP (ref 10–15.2)
RBC # BLD: 4.15 M/UL — SIGNIFICANT CHANGE UP (ref 3.8–5.2)
RBC # FLD: 14.9 % — HIGH (ref 10.3–14.5)
SODIUM SERPL-SCNC: 142 MMOL/L — SIGNIFICANT CHANGE UP (ref 135–145)
THROMBIN TIME: 20.2 SEC — SIGNIFICANT CHANGE UP (ref 16–25)
WBC # BLD: 7.89 K/UL — SIGNIFICANT CHANGE UP (ref 3.8–10.5)
WBC # FLD AUTO: 7.89 K/UL — SIGNIFICANT CHANGE UP (ref 3.8–10.5)

## 2019-09-22 PROCEDURE — 99232 SBSQ HOSP IP/OBS MODERATE 35: CPT

## 2019-09-22 RX ORDER — DEXTROSE MONOHYDRATE, SODIUM CHLORIDE, AND POTASSIUM CHLORIDE 50; .745; 4.5 G/1000ML; G/1000ML; G/1000ML
1000 INJECTION, SOLUTION INTRAVENOUS
Refills: 0 | Status: DISCONTINUED | OUTPATIENT
Start: 2019-09-22 | End: 2019-09-24

## 2019-09-22 RX ORDER — OXYCODONE HYDROCHLORIDE 5 MG/1
5 TABLET ORAL ONCE
Refills: 0 | Status: DISCONTINUED | OUTPATIENT
Start: 2019-09-22 | End: 2019-09-22

## 2019-09-22 RX ADMIN — DEXTROSE MONOHYDRATE, SODIUM CHLORIDE, AND POTASSIUM CHLORIDE 100 MILLILITER(S): 50; .745; 4.5 INJECTION, SOLUTION INTRAVENOUS at 16:32

## 2019-09-22 RX ADMIN — PIPERACILLIN AND TAZOBACTAM 25 GRAM(S): 4; .5 INJECTION, POWDER, LYOPHILIZED, FOR SOLUTION INTRAVENOUS at 06:12

## 2019-09-22 RX ADMIN — OXYCODONE HYDROCHLORIDE 5 MILLIGRAM(S): 5 TABLET ORAL at 22:41

## 2019-09-22 RX ADMIN — HYDROMORPHONE HYDROCHLORIDE 1 MILLIGRAM(S): 2 INJECTION INTRAMUSCULAR; INTRAVENOUS; SUBCUTANEOUS at 05:50

## 2019-09-22 RX ADMIN — HYDROMORPHONE HYDROCHLORIDE 1 MILLIGRAM(S): 2 INJECTION INTRAMUSCULAR; INTRAVENOUS; SUBCUTANEOUS at 10:04

## 2019-09-22 RX ADMIN — ONDANSETRON 4 MILLIGRAM(S): 8 TABLET, FILM COATED ORAL at 10:23

## 2019-09-22 RX ADMIN — OXYCODONE HYDROCHLORIDE 5 MILLIGRAM(S): 5 TABLET ORAL at 23:18

## 2019-09-22 RX ADMIN — Medication 10 MILLIGRAM(S): at 06:12

## 2019-09-22 RX ADMIN — Medication 650 MILLIGRAM(S): at 22:41

## 2019-09-22 RX ADMIN — HYDROMORPHONE HYDROCHLORIDE 1 MILLIGRAM(S): 2 INJECTION INTRAMUSCULAR; INTRAVENOUS; SUBCUTANEOUS at 04:48

## 2019-09-22 RX ADMIN — OXYCODONE HYDROCHLORIDE 5 MILLIGRAM(S): 5 TABLET ORAL at 21:50

## 2019-09-22 RX ADMIN — Medication 650 MILLIGRAM(S): at 23:18

## 2019-09-22 RX ADMIN — PIPERACILLIN AND TAZOBACTAM 25 GRAM(S): 4; .5 INJECTION, POWDER, LYOPHILIZED, FOR SOLUTION INTRAVENOUS at 21:13

## 2019-09-22 RX ADMIN — OXYCODONE HYDROCHLORIDE 5 MILLIGRAM(S): 5 TABLET ORAL at 16:32

## 2019-09-22 RX ADMIN — PANTOPRAZOLE SODIUM 40 MILLIGRAM(S): 20 TABLET, DELAYED RELEASE ORAL at 11:23

## 2019-09-22 RX ADMIN — OXYCODONE HYDROCHLORIDE 5 MILLIGRAM(S): 5 TABLET ORAL at 22:17

## 2019-09-22 RX ADMIN — OXYCODONE HYDROCHLORIDE 5 MILLIGRAM(S): 5 TABLET ORAL at 02:26

## 2019-09-22 RX ADMIN — HYDROMORPHONE HYDROCHLORIDE 1 MILLIGRAM(S): 2 INJECTION INTRAMUSCULAR; INTRAVENOUS; SUBCUTANEOUS at 09:34

## 2019-09-22 RX ADMIN — PIPERACILLIN AND TAZOBACTAM 25 GRAM(S): 4; .5 INJECTION, POWDER, LYOPHILIZED, FOR SOLUTION INTRAVENOUS at 14:10

## 2019-09-22 RX ADMIN — OXYCODONE HYDROCHLORIDE 5 MILLIGRAM(S): 5 TABLET ORAL at 11:50

## 2019-09-22 RX ADMIN — OXYCODONE HYDROCHLORIDE 5 MILLIGRAM(S): 5 TABLET ORAL at 11:20

## 2019-09-22 RX ADMIN — OXYCODONE HYDROCHLORIDE 5 MILLIGRAM(S): 5 TABLET ORAL at 03:58

## 2019-09-22 NOTE — PROGRESS NOTE ADULT - ASSESSMENT
A/P:  Acute cholecystitis  Von Willebrand Disease, unknown type  Hematology on consult, awaiting assay results; Heme cannot give recommendations of definitive therapeutic agents for surgery until assays completed, approx  9/23/19  Pt aware of all of the above, wants to await appropriate/definitive hematology recommendations before surgery  IV antibiotics  Hospitalist on consult for medical management  GI/DVT prophylaxis  Pain control  Incentive spirometry  Serial abd exams  F/U labs  Tentative cholecystectomy 9/23/19 pending hematology definitive recommendations  Pt will be monitored for signs of evolution/resolution of pathology and surgical intervention as required and warranted  Pt aware of and agrees with all of the above

## 2019-09-22 NOTE — PROGRESS NOTE ADULT - SUBJECTIVE AND OBJECTIVE BOX
CC:Patient is a 54y old  Female who presents with a chief complaint of Acute Cholecystitis (21 Sep 2019 15:07)      Subjective:  Pt seen and examined at bedside with chaperone. Pt is AAOx3, pt in no acute distress. Pt states c/o right upper abd pain, and nausea. Pt denied c/o fever, chills, chest pain, SOB, V/D, extremity pain or dysfunction, hemoptysis, hematemesis, hematuria, hematochexia, headache, diplopia, vertigo, dizzyness. Pt tolerating diet, (+) void, (+) ambulation, (+) bowel function    Hematology on consult, cannot give recommendations for vWD treatment until assays completed, approx 9/23/19    ROS:  abd pain, nausea, otherwise as abovementioned ROS    Vital Signs Last 24 Hrs  T(C): 36.5 (22 Sep 2019 10:43), Max: 36.8 (21 Sep 2019 16:42)  T(F): 97.7 (22 Sep 2019 10:43), Max: 98.3 (21 Sep 2019 22:00)  HR: 68 (22 Sep 2019 10:43) (66 - 72)  BP: 151/84 (22 Sep 2019 10:43) (128/66 - 151/84)  BP(mean): --  RR: 18 (22 Sep 2019 10:43) (16 - 18)  SpO2: 94% (22 Sep 2019 10:43) (94% - 99%)    Labs:                      11.2   7.89  )-----------( 205      ( 22 Sep 2019 07:44 )             34.0     CBC Full  -  ( 22 Sep 2019 07:44 )  WBC Count : 7.89 K/uL  RBC Count : 4.15 M/uL  Hemoglobin : 11.2 g/dL  Hematocrit : 34.0 %  Platelet Count - Automated : 205 K/uL  Mean Cell Volume : 81.9 fl  Mean Cell Hemoglobin : 27.0 pg  Mean Cell Hemoglobin Concentration : 32.9 gm/dL  Auto Neutrophil # : x  Auto Lymphocyte # : x  Auto Monocyte # : x  Auto Eosinophil # : x  Auto Basophil # : x  Auto Neutrophil % : x  Auto Lymphocyte % : x  Auto Monocyte % : x  Auto Eosinophil % : x  Auto Basophil % : x    09-22    142  |  108  |  8   ----------------------------<  95  3.8   |  26  |  0.82    Ca    8.6      22 Sep 2019 07:44    TPro  6.7  /  Alb  2.9<L>  /  TBili  0.5  /  DBili  0.1  /  AST  11<L>  /  ALT  17  /  AlkPhos  67  09-22    LIVER FUNCTIONS - ( 22 Sep 2019 07:44 )  Alb: 2.9 g/dL / Pro: 6.7 gm/dL / ALK PHOS: 67 U/L / ALT: 17 U/L / AST: 11 U/L / GGT: x           PT/INR - ( 21 Sep 2019 17:37 )   PT: 13.8 sec;   INR: 1.24 ratio         PTT - ( 21 Sep 2019 07:43 )  PTT:32.9 sec      Meds:  acetaminophen   Tablet .. 650 milliGRAM(s) Oral every 6 hours PRN  ALBUTerol    90 MICROgram(s) HFA Inhaler 2 Puff(s) Inhalation every 6 hours PRN  enalapril 10 milliGRAM(s) Oral daily  HYDROmorphone  Injectable 1 milliGRAM(s) IV Push every 4 hours PRN  influenza   Vaccine 0.5 milliLiter(s) IntraMuscular once  ondansetron Injectable 4 milliGRAM(s) IV Push every 6 hours PRN  oxyCODONE    IR 5 milliGRAM(s) Oral every 6 hours PRN  pantoprazole  Injectable 40 milliGRAM(s) IV Push daily  piperacillin/tazobactam IVPB.. 3.375 Gram(s) IV Intermittent every 8 hours  sodium chloride 0.9%. 1000 milliLiter(s) IV Continuous <Continuous>      Radiology:  < from: NM Hepatobiliary Imaging (09.21.19 @ 12:29) >  EXAM:  NM HEPATOBILIARY IMG                            PROCEDURE DATE:  09/21/2019          INTERPRETATION:  RADIOPHARMACEUTICAL: 3.2 mCi Tc-99m-mebrofenin, I.V.; 2   doses    CLINICAL INFORMATION: 54 year old female with suspicious findings on   ultrasound; referred to evaluate for acute cholecystitis.    TECHNIQUE:  Dynamic images of the anterior abdomen were obtained for 2   hours following radiopharmaceutical injection followed by static images   of the abdomen in the anterior, right anterior oblique and right lateral   projections. Morphine 4 mg I.V. and a second dose of radiopharmaceutical   were administered at approximately 60 minutes.    COMPARISON: Abdominal ultrasound 9/20/19    FINDINGS: There is prompt, homogeneous uptake of radiopharmaceutical by   the hepatocytes. Activity is first seen in the bowel at about 30 minutes.   The gallbladder is not visualized at any time during the study, despite   morphine administration. There is good clearance of activity from the   liver at the end of the study.    IMPRESSION: Abnormal morphine-augmented hepatobiliary scan: acute   cholecystitis.    Dr. Gavin, surgical resident was informed of these results by Dr. Barr with read back at about 12:30 pm on September 21, 2019.                NATHANIEL BARR   This document has been electronically signed. Sep 21 2019 12:39PM          < end of copied text >      Physical exam:  Pt is aaox3  Pt in no acute distress  Psych: normal affect  Resp: CTAB  CVS: S1S2(+)  ABD: obese habitus, bowel sounds (+), soft, non distended grossly, no rebound, no guarding, no rigidity, no skin changes to exam. (+) right upper quadrant tenderness to exam  EXT: no calf tenderness or edema to exam b/l, on VTE prophylaxis  Skin: no adverse skin changes to exam, no icteric sclera or jaundice

## 2019-09-23 ENCOUNTER — RESULT REVIEW (OUTPATIENT)
Age: 54
End: 2019-09-23

## 2019-09-23 LAB
ALBUMIN SERPL ELPH-MCNC: 2.7 G/DL — LOW (ref 3.3–5)
ALP SERPL-CCNC: 68 U/L — SIGNIFICANT CHANGE UP (ref 40–120)
ALT FLD-CCNC: 17 U/L — SIGNIFICANT CHANGE UP (ref 12–78)
ANION GAP SERPL CALC-SCNC: 6 MMOL/L — SIGNIFICANT CHANGE UP (ref 5–17)
AST SERPL-CCNC: 15 U/L — SIGNIFICANT CHANGE UP (ref 15–37)
BILIRUB DIRECT SERPL-MCNC: 0.1 MG/DL — SIGNIFICANT CHANGE UP (ref 0–0.2)
BILIRUB INDIRECT FLD-MCNC: 0.3 MG/DL — SIGNIFICANT CHANGE UP (ref 0.2–1)
BILIRUB SERPL-MCNC: 0.4 MG/DL — SIGNIFICANT CHANGE UP (ref 0.2–1.2)
BUN SERPL-MCNC: 5 MG/DL — LOW (ref 7–23)
CALCIUM SERPL-MCNC: 8.3 MG/DL — LOW (ref 8.5–10.1)
CHLORIDE SERPL-SCNC: 109 MMOL/L — HIGH (ref 96–108)
CO2 SERPL-SCNC: 27 MMOL/L — SIGNIFICANT CHANGE UP (ref 22–31)
CREAT SERPL-MCNC: 0.79 MG/DL — SIGNIFICANT CHANGE UP (ref 0.5–1.3)
FACT VIII ACT/NOR PPP: 101 % — SIGNIFICANT CHANGE UP (ref 60–125)
GLUCOSE SERPL-MCNC: 108 MG/DL — HIGH (ref 70–99)
HCT VFR BLD CALC: 33.1 % — LOW (ref 34.5–45)
HGB BLD-MCNC: 10.6 G/DL — LOW (ref 11.5–15.5)
MCHC RBC-ENTMCNC: 26.3 PG — LOW (ref 27–34)
MCHC RBC-ENTMCNC: 32 GM/DL — SIGNIFICANT CHANGE UP (ref 32–36)
MCV RBC AUTO: 82.1 FL — SIGNIFICANT CHANGE UP (ref 80–100)
PLATELET # BLD AUTO: 209 K/UL — SIGNIFICANT CHANGE UP (ref 150–400)
POTASSIUM SERPL-MCNC: 4.2 MMOL/L — SIGNIFICANT CHANGE UP (ref 3.5–5.3)
POTASSIUM SERPL-SCNC: 4.2 MMOL/L — SIGNIFICANT CHANGE UP (ref 3.5–5.3)
PROT SERPL-MCNC: 6.5 GM/DL — SIGNIFICANT CHANGE UP (ref 6–8.3)
RBC # BLD: 4.03 M/UL — SIGNIFICANT CHANGE UP (ref 3.8–5.2)
RBC # FLD: 14.8 % — HIGH (ref 10.3–14.5)
SODIUM SERPL-SCNC: 142 MMOL/L — SIGNIFICANT CHANGE UP (ref 135–145)
VWF AG ACT/NOR PPP IA: 107 % — SIGNIFICANT CHANGE UP (ref 63–170)
VWF:RCO ACT/NOR PPP PL AGG: 113 % — SIGNIFICANT CHANGE UP (ref 45–133)
WBC # BLD: 7.14 K/UL — SIGNIFICANT CHANGE UP (ref 3.8–10.5)
WBC # FLD AUTO: 7.14 K/UL — SIGNIFICANT CHANGE UP (ref 3.8–10.5)

## 2019-09-23 PROCEDURE — 99232 SBSQ HOSP IP/OBS MODERATE 35: CPT

## 2019-09-23 PROCEDURE — 88304 TISSUE EXAM BY PATHOLOGIST: CPT | Mod: 26

## 2019-09-23 PROCEDURE — 47562 LAPAROSCOPIC CHOLECYSTECTOMY: CPT

## 2019-09-23 PROCEDURE — 99233 SBSQ HOSP IP/OBS HIGH 50: CPT | Mod: 57

## 2019-09-23 RX ORDER — DESMOPRESSIN ACETATE 0.1 MG/1
20 TABLET ORAL ONCE
Refills: 0 | Status: DISCONTINUED | OUTPATIENT
Start: 2019-09-23 | End: 2019-09-23

## 2019-09-23 RX ORDER — ALBUTEROL 90 UG/1
2.5 AEROSOL, METERED ORAL ONCE
Refills: 0 | Status: COMPLETED | OUTPATIENT
Start: 2019-09-23 | End: 2019-09-23

## 2019-09-23 RX ORDER — OXYCODONE HYDROCHLORIDE 5 MG/1
10 TABLET ORAL ONCE
Refills: 0 | Status: DISCONTINUED | OUTPATIENT
Start: 2019-09-23 | End: 2019-09-23

## 2019-09-23 RX ORDER — SODIUM CHLORIDE 9 MG/ML
1000 INJECTION, SOLUTION INTRAVENOUS
Refills: 0 | Status: DISCONTINUED | OUTPATIENT
Start: 2019-09-23 | End: 2019-09-23

## 2019-09-23 RX ORDER — ACETAMINOPHEN 500 MG
1000 TABLET ORAL ONCE
Refills: 0 | Status: COMPLETED | OUTPATIENT
Start: 2019-09-23 | End: 2019-09-23

## 2019-09-23 RX ORDER — FENTANYL CITRATE 50 UG/ML
50 INJECTION INTRAVENOUS
Refills: 0 | Status: DISCONTINUED | OUTPATIENT
Start: 2019-09-23 | End: 2019-09-23

## 2019-09-23 RX ORDER — MORPHINE SULFATE 50 MG/1
2 CAPSULE, EXTENDED RELEASE ORAL EVERY 4 HOURS
Refills: 0 | Status: DISCONTINUED | OUTPATIENT
Start: 2019-09-23 | End: 2019-09-24

## 2019-09-23 RX ORDER — HYDROMORPHONE HYDROCHLORIDE 2 MG/ML
0.5 INJECTION INTRAMUSCULAR; INTRAVENOUS; SUBCUTANEOUS
Refills: 0 | Status: DISCONTINUED | OUTPATIENT
Start: 2019-09-23 | End: 2019-09-24

## 2019-09-23 RX ORDER — ONDANSETRON 8 MG/1
4 TABLET, FILM COATED ORAL ONCE
Refills: 0 | Status: DISCONTINUED | OUTPATIENT
Start: 2019-09-23 | End: 2019-09-23

## 2019-09-23 RX ADMIN — ONDANSETRON 4 MILLIGRAM(S): 8 TABLET, FILM COATED ORAL at 00:13

## 2019-09-23 RX ADMIN — MORPHINE SULFATE 2 MILLIGRAM(S): 50 CAPSULE, EXTENDED RELEASE ORAL at 19:59

## 2019-09-23 RX ADMIN — HYDROMORPHONE HYDROCHLORIDE 0.5 MILLIGRAM(S): 2 INJECTION INTRAMUSCULAR; INTRAVENOUS; SUBCUTANEOUS at 19:00

## 2019-09-23 RX ADMIN — SODIUM CHLORIDE 75 MILLILITER(S): 9 INJECTION, SOLUTION INTRAVENOUS at 18:13

## 2019-09-23 RX ADMIN — DEXTROSE MONOHYDRATE, SODIUM CHLORIDE, AND POTASSIUM CHLORIDE 100 MILLILITER(S): 50; .745; 4.5 INJECTION, SOLUTION INTRAVENOUS at 13:28

## 2019-09-23 RX ADMIN — DEXTROSE MONOHYDRATE, SODIUM CHLORIDE, AND POTASSIUM CHLORIDE 100 MILLILITER(S): 50; .745; 4.5 INJECTION, SOLUTION INTRAVENOUS at 02:00

## 2019-09-23 RX ADMIN — MORPHINE SULFATE 2 MILLIGRAM(S): 50 CAPSULE, EXTENDED RELEASE ORAL at 19:44

## 2019-09-23 RX ADMIN — OXYCODONE HYDROCHLORIDE 5 MILLIGRAM(S): 5 TABLET ORAL at 19:00

## 2019-09-23 RX ADMIN — Medication 400 MILLIGRAM(S): at 18:10

## 2019-09-23 RX ADMIN — OXYCODONE HYDROCHLORIDE 10 MILLIGRAM(S): 5 TABLET ORAL at 18:10

## 2019-09-23 RX ADMIN — HYDROMORPHONE HYDROCHLORIDE 0.5 MILLIGRAM(S): 2 INJECTION INTRAMUSCULAR; INTRAVENOUS; SUBCUTANEOUS at 17:47

## 2019-09-23 RX ADMIN — DEXTROSE MONOHYDRATE, SODIUM CHLORIDE, AND POTASSIUM CHLORIDE 100 MILLILITER(S): 50; .745; 4.5 INJECTION, SOLUTION INTRAVENOUS at 19:58

## 2019-09-23 RX ADMIN — PIPERACILLIN AND TAZOBACTAM 25 GRAM(S): 4; .5 INJECTION, POWDER, LYOPHILIZED, FOR SOLUTION INTRAVENOUS at 06:00

## 2019-09-23 RX ADMIN — ALBUTEROL 2.5 MILLIGRAM(S): 90 AEROSOL, METERED ORAL at 18:23

## 2019-09-23 RX ADMIN — PANTOPRAZOLE SODIUM 40 MILLIGRAM(S): 20 TABLET, DELAYED RELEASE ORAL at 11:45

## 2019-09-23 RX ADMIN — PIPERACILLIN AND TAZOBACTAM 25 GRAM(S): 4; .5 INJECTION, POWDER, LYOPHILIZED, FOR SOLUTION INTRAVENOUS at 13:28

## 2019-09-23 RX ADMIN — HYDROMORPHONE HYDROCHLORIDE 0.5 MILLIGRAM(S): 2 INJECTION INTRAMUSCULAR; INTRAVENOUS; SUBCUTANEOUS at 18:13

## 2019-09-23 RX ADMIN — OXYCODONE HYDROCHLORIDE 5 MILLIGRAM(S): 5 TABLET ORAL at 10:32

## 2019-09-23 RX ADMIN — OXYCODONE HYDROCHLORIDE 10 MILLIGRAM(S): 5 TABLET ORAL at 19:00

## 2019-09-23 RX ADMIN — Medication 1000 MILLIGRAM(S): at 19:00

## 2019-09-23 RX ADMIN — PIPERACILLIN AND TAZOBACTAM 25 GRAM(S): 4; .5 INJECTION, POWDER, LYOPHILIZED, FOR SOLUTION INTRAVENOUS at 21:52

## 2019-09-23 NOTE — PROGRESS NOTE ADULT - SUBJECTIVE AND OBJECTIVE BOX
Pt seen and examined at bedside Pt has acute cholecystitis. Awaiting Oncology clearance. Pt explained the surgical procedures in both medical terminology and in lay terms that the patient understood, laparascopic possible open cholecystectomy. Pt explained in both medical terminology and in lay terms that the patient understood, the benefits and alternatives of surgery including non-operative management. Pt explained at length in both medical terminology and in lay terms that the patient understood, the associated risks of surgery including but not limited to infection, bleeding, nerve/organ injury, postoperative pain, poor wound healing, scar formation both internally and externally, risk of seroma/hematoma/abcess/biloma formation, risk of hernia development, risk of injury to liver and/or biliary tree/ductal system, risk of need for further GI/IR/Surgery intervention as required. Pt explained in both medical terminology and in lay terms that the patient understood, the associated ernie and post operative risks of cardiac/cns/respiratory insult or injury, risk of death. Pt understood all of the above. All questions were answered. Pt gave informed consent for surgery.

## 2019-09-23 NOTE — PROGRESS NOTE ADULT - ASSESSMENT
55 y/o female admitted  with acute cholecystitis  History of von Willebrand disease- but no records available.    Hx of bleeding during prior surgeries- but did not need transfusions for surgical blood loss. Hx of PRBC transfusion for sever THOMAS due to menstrual blood loss.  Management  per surgery/ medicine  Might need surgery/ cholecystectomy.    Awaiting results of von Willebrand panel to decide re: preoperative management ( DDAVP alone versus factor replacement).  Called [facility] lab coag department- received blood samples- results expected  later this  afternoon/ evening. Will follow up.

## 2019-09-23 NOTE — BRIEF OPERATIVE NOTE - OPERATION/FINDINGS
Gangrenous gallbladder with spillage  Abdomen irrigated  ISAAC drain inserted  Cholecystectomy performed without complications

## 2019-09-23 NOTE — PROGRESS NOTE ADULT - SUBJECTIVE AND OBJECTIVE BOX
hpi: 54y female w/ pmh htn admitted with abdominal pain. Patient on surgery service, and plan for possible cholecystectomy.   Hematology consulted as patient reports history of von willibrands disease.   On further talking to patient she states that she was diagnosed with von willibrands disease about 7 years ago. Work up was actually initiated at the time because her son excessive nose bleeds and was diagnosed with vWD.  She had a few surgeries prior to diagnosis ( fibroid removal as well as some breast surgeries) with no excessive bleeding.   Post diagnosis of vwd she had a hysterectomy and was treated with 'something' but doesnt quite remember what.   [facility]ateints medical care has been at [facility] in [place].   She has a h/o heavy menstrual bleeding  Patient has a h/o 2 blood transfusions due to heavy periods. No h/o transfusions related to surgeries  Currently patient denies chest pain , sob, palp- no hx CAD, no DM.  Able to ambulate w/o cp or sob.     9/23/19 No major changes. Afebrile. On antibiotics. pain is better. Feels hungry.  Coags w/up- von Willebrand panel - ordered by Dr Henry- pending    PAST MEDICAL & SURGICAL HISTORY:  htn, ?von willebrand disease  KAREN, appendectomy  Fibroids, Breast surgery    FAMILY HISTORY: son with vWD  Social History: no etoh or tobacco use    Vital Signs Last 24 Hrs  T(C): 36.6 (23 Sep 2019 05:25), Max: 36.8 (22 Sep 2019 17:16)  T(F): 97.9 (23 Sep 2019 05:25), Max: 98.3 (22 Sep 2019 17:16)  HR: 70 (23 Sep 2019 05:25) (68 - 88)  BP: 101/52 (23 Sep 2019 05:25) (101/52 - 151/84)  BP(mean): --  RR: 16 (22 Sep 2019 21:14) (16 - 19)  SpO2: 96% (23 Sep 2019 05:25) (94% - 96%)    Middle aged AA female NAD. C/o mild RUQ discomfort. Abdomen soft.  Skin- no bruises, no no petechiae                           10.6   7.14  )-----------( 209      ( 23 Sep 2019 07:09 )             33.1     MEDICATIONS  (STANDING):  dextrose 5% + sodium chloride 0.45% with potassium chloride 20 mEq/L 1000 milliLiter(s) (100 mL/Hr) IV Continuous <Continuous>  enalapril 10 milliGRAM(s) Oral daily  influenza   Vaccine 0.5 milliLiter(s) IntraMuscular once  pantoprazole  Injectable 40 milliGRAM(s) IV Push daily  piperacillin/tazobactam IVPB.. 3.375 Gram(s) IV Intermittent every 8 hours    MEDICATIONS  (PRN):  acetaminophen   Tablet .. 650 milliGRAM(s) Oral every 6 hours PRN Temp greater or equal to 38C (100.4F), Mild Pain (1 - 3)  ALBUTerol    90 MICROgram(s) HFA Inhaler 2 Puff(s) Inhalation every 6 hours PRN for shortness of breath and/or wheezing  HYDROmorphone  Injectable 1 milliGRAM(s) IV Push every 4 hours PRN Severe Pain (7 - 10)  ondansetron Injectable 4 milliGRAM(s) IV Push every 6 hours PRN Nausea  oxyCODONE    IR 5 milliGRAM(s) Oral every 6 hours PRN Moderate Pain (4 - 6)

## 2019-09-23 NOTE — PROGRESS NOTE ADULT - SUBJECTIVE AND OBJECTIVE BOX
cc: abdominal pain  hpi: 54y female w/ pmh htn presents w/ abdominal pain for few days.  Points to epigastric region stating it's mostly there but sometimes radiates to ruq, worse w/ eating and associated w/ nausea, vomiting.  Denies fevers, no diarrhea.  Regular bm, good uop.   Pain significantly improved since arrival in ED.  Denies chest pain , sob, palp- no hx CAD, no DM.  Able to ambulate w/o cp or sob.     9/23- intermittent abd pain.       Vital Signs Last 24 Hrs  T(C): 36.6 (23 Sep 2019 10:24), Max: 36.8 (22 Sep 2019 17:16)  T(F): 97.9 (23 Sep 2019 10:24), Max: 98.3 (22 Sep 2019 17:16)  HR: 68 (23 Sep 2019 10:24) (68 - 88)  BP: 144/84 (23 Sep 2019 10:24) (101/52 - 144/84)  BP(mean): --  RR: 19 (23 Sep 2019 10:24) (16 - 19)  SpO2: 100% (23 Sep 2019 10:24) (95% - 100%)      PHYSICAL EXAM:  General: NAD, comfortable  Neuro: AAOx3, no focal deficits  HEENT: NCAT, EOMI  Neck: Soft and supple  Respiratory: CTA b/l, no w/r/r  Cardiovascular: S1 and S2, RRR  Gastrointestinal: soft; epigastric ttp and mild ruq ttp; no rebound tenderness or guarding  Extremities: No edema  Vascular: 2+ peripheral pulses  Musculoskeletal: 5/5 strength b/l UE and LE          LABS: All Labs Reviewed:                        10.6   7.14  )-----------( 209      ( 23 Sep 2019 07:09 )             33.1     09-23    142  |  109<H>  |  5<L>  ----------------------------<  108<H>  4.2   |  27  |  0.79    Ca    8.3<L>      23 Sep 2019 07:09    TPro  6.5  /  Alb  2.7<L>  /  TBili  0.4  /  DBili  0.1  /  AST  15  /  ALT  17  /  AlkPhos  68  09-23        < from: [facility] Hepatobiliary Imaging (09.21.19 @ 12:29) >  IMPRESSION: Abnormal morphine-augmented hepatobiliary scan: acute   cholecystitis.    Dr. Gavin, surgical resident was informed of these results by Dr. Patel with read back at about 12:30 pm on September 21, 2019.    < end of copied text >        MEDICATIONS  (STANDING):  desmopressin Injectable 20 MICROGram(s) IV Push once  dextrose 5% + sodium chloride 0.45% with potassium chloride 20 mEq/L 1000 milliLiter(s) (100 mL/Hr) IV Continuous <Continuous>  influenza   Vaccine 0.5 milliLiter(s) IntraMuscular once  pantoprazole  Injectable 40 milliGRAM(s) IV Push daily  piperacillin/tazobactam IVPB.. 3.375 Gram(s) IV Intermittent every 8 hours    MEDICATIONS  (PRN):  ALBUTerol    90 MICROgram(s) HFA Inhaler 2 Puff(s) Inhalation every 6 hours PRN for shortness of breath and/or wheezing  HYDROmorphone  Injectable 1 milliGRAM(s) IV Push every 4 hours PRN Severe Pain (7 - 10)  morphine  - Injectable 2 milliGRAM(s) IV Push every 4 hours PRN Moderate Pain (4 - 6)  ondansetron Injectable 4 milliGRAM(s) IV Push every 6 hours PRN Nausea          Assessment and Recommendation:         1. acute cholecystitis  - management as per Surgery  - ivf, iv zosyn, pain control  patient has no contraindications for surgery.     2. htn   - continue enalapril    3. hx von willebrands disease  - Heme/Onc following - labs ordered , ddavp prior to procedure    4. dvt px  - pt ambulating       Thank you for this consult.  We will follow with you.

## 2019-09-23 NOTE — CHART NOTE - NSCHARTNOTEFT_GEN_A_CORE
Spoke with Dr. Landis regarding results of vWB assay. States it is a mild disease based on the results and can proceed with laparoscopic cholecystectomy safely today. Recommends giving 1 dose of DDAVP (wt based; can give 20mcg) immediately prior to surgery.    Dr. Rogel notified of recommendations.

## 2019-09-24 DIAGNOSIS — Z90.710 ACQUIRED ABSENCE OF BOTH CERVIX AND UTERUS: Chronic | ICD-10-CM

## 2019-09-24 LAB
ALBUMIN SERPL ELPH-MCNC: 2.6 G/DL — LOW (ref 3.3–5)
ALP SERPL-CCNC: 66 U/L — SIGNIFICANT CHANGE UP (ref 40–120)
ALT FLD-CCNC: 34 U/L — SIGNIFICANT CHANGE UP (ref 12–78)
ANION GAP SERPL CALC-SCNC: 8 MMOL/L — SIGNIFICANT CHANGE UP (ref 5–17)
AST SERPL-CCNC: 35 U/L — SIGNIFICANT CHANGE UP (ref 15–37)
BILIRUB SERPL-MCNC: 0.5 MG/DL — SIGNIFICANT CHANGE UP (ref 0.2–1.2)
BUN SERPL-MCNC: 4 MG/DL — LOW (ref 7–23)
CALCIUM SERPL-MCNC: 8.2 MG/DL — LOW (ref 8.5–10.1)
CHLORIDE SERPL-SCNC: 106 MMOL/L — SIGNIFICANT CHANGE UP (ref 96–108)
CO2 SERPL-SCNC: 26 MMOL/L — SIGNIFICANT CHANGE UP (ref 22–31)
CREAT SERPL-MCNC: 0.69 MG/DL — SIGNIFICANT CHANGE UP (ref 0.5–1.3)
GLUCOSE SERPL-MCNC: 105 MG/DL — HIGH (ref 70–99)
HCT VFR BLD CALC: 32.6 % — LOW (ref 34.5–45)
HGB BLD-MCNC: 10.5 G/DL — LOW (ref 11.5–15.5)
MCHC RBC-ENTMCNC: 26.6 PG — LOW (ref 27–34)
MCHC RBC-ENTMCNC: 32.2 GM/DL — SIGNIFICANT CHANGE UP (ref 32–36)
MCV RBC AUTO: 82.5 FL — SIGNIFICANT CHANGE UP (ref 80–100)
PLATELET # BLD AUTO: 213 K/UL — SIGNIFICANT CHANGE UP (ref 150–400)
POTASSIUM SERPL-MCNC: 3.8 MMOL/L — SIGNIFICANT CHANGE UP (ref 3.5–5.3)
POTASSIUM SERPL-SCNC: 3.8 MMOL/L — SIGNIFICANT CHANGE UP (ref 3.5–5.3)
PROT SERPL-MCNC: 6.4 GM/DL — SIGNIFICANT CHANGE UP (ref 6–8.3)
RBC # BLD: 3.95 M/UL — SIGNIFICANT CHANGE UP (ref 3.8–5.2)
RBC # FLD: 14.9 % — HIGH (ref 10.3–14.5)
SODIUM SERPL-SCNC: 140 MMOL/L — SIGNIFICANT CHANGE UP (ref 135–145)
WBC # BLD: 7.22 K/UL — SIGNIFICANT CHANGE UP (ref 3.8–10.5)
WBC # FLD AUTO: 7.22 K/UL — SIGNIFICANT CHANGE UP (ref 3.8–10.5)

## 2019-09-24 PROCEDURE — 99024 POSTOP FOLLOW-UP VISIT: CPT

## 2019-09-24 PROCEDURE — 99231 SBSQ HOSP IP/OBS SF/LOW 25: CPT

## 2019-09-24 RX ORDER — ACETAMINOPHEN 500 MG
650 TABLET ORAL EVERY 4 HOURS
Refills: 0 | Status: DISCONTINUED | OUTPATIENT
Start: 2019-09-24 | End: 2019-09-25

## 2019-09-24 RX ORDER — ALBUTEROL 90 UG/1
2.5 AEROSOL, METERED ORAL ONCE
Refills: 0 | Status: DISCONTINUED | OUTPATIENT
Start: 2019-09-24 | End: 2019-09-24

## 2019-09-24 RX ORDER — ALBUTEROL 90 UG/1
1.25 AEROSOL, METERED ORAL EVERY 6 HOURS
Refills: 0 | Status: DISCONTINUED | OUTPATIENT
Start: 2019-09-24 | End: 2019-09-24

## 2019-09-24 RX ORDER — OXYCODONE HYDROCHLORIDE 5 MG/1
10 TABLET ORAL EVERY 4 HOURS
Refills: 0 | Status: DISCONTINUED | OUTPATIENT
Start: 2019-09-24 | End: 2019-09-25

## 2019-09-24 RX ORDER — MORPHINE SULFATE 50 MG/1
2 CAPSULE, EXTENDED RELEASE ORAL EVERY 4 HOURS
Refills: 0 | Status: DISCONTINUED | OUTPATIENT
Start: 2019-09-24 | End: 2019-09-25

## 2019-09-24 RX ORDER — DEXTROSE MONOHYDRATE, SODIUM CHLORIDE, AND POTASSIUM CHLORIDE 50; .745; 4.5 G/1000ML; G/1000ML; G/1000ML
1000 INJECTION, SOLUTION INTRAVENOUS
Refills: 0 | Status: DISCONTINUED | OUTPATIENT
Start: 2019-09-24 | End: 2019-09-24

## 2019-09-24 RX ORDER — ALBUTEROL 90 UG/1
2.5 AEROSOL, METERED ORAL EVERY 6 HOURS
Refills: 0 | Status: DISCONTINUED | OUTPATIENT
Start: 2019-09-24 | End: 2019-09-25

## 2019-09-24 RX ORDER — PANTOPRAZOLE SODIUM 20 MG/1
40 TABLET, DELAYED RELEASE ORAL
Refills: 0 | Status: DISCONTINUED | OUTPATIENT
Start: 2019-09-24 | End: 2019-09-25

## 2019-09-24 RX ADMIN — MORPHINE SULFATE 2 MILLIGRAM(S): 50 CAPSULE, EXTENDED RELEASE ORAL at 17:12

## 2019-09-24 RX ADMIN — MORPHINE SULFATE 2 MILLIGRAM(S): 50 CAPSULE, EXTENDED RELEASE ORAL at 17:27

## 2019-09-24 RX ADMIN — OXYCODONE HYDROCHLORIDE 10 MILLIGRAM(S): 5 TABLET ORAL at 21:39

## 2019-09-24 RX ADMIN — MORPHINE SULFATE 2 MILLIGRAM(S): 50 CAPSULE, EXTENDED RELEASE ORAL at 05:20

## 2019-09-24 RX ADMIN — PIPERACILLIN AND TAZOBACTAM 25 GRAM(S): 4; .5 INJECTION, POWDER, LYOPHILIZED, FOR SOLUTION INTRAVENOUS at 21:39

## 2019-09-24 RX ADMIN — PIPERACILLIN AND TAZOBACTAM 25 GRAM(S): 4; .5 INJECTION, POWDER, LYOPHILIZED, FOR SOLUTION INTRAVENOUS at 13:47

## 2019-09-24 RX ADMIN — PIPERACILLIN AND TAZOBACTAM 25 GRAM(S): 4; .5 INJECTION, POWDER, LYOPHILIZED, FOR SOLUTION INTRAVENOUS at 05:21

## 2019-09-24 RX ADMIN — DEXTROSE MONOHYDRATE, SODIUM CHLORIDE, AND POTASSIUM CHLORIDE 100 MILLILITER(S): 50; .745; 4.5 INJECTION, SOLUTION INTRAVENOUS at 05:08

## 2019-09-24 RX ADMIN — MORPHINE SULFATE 2 MILLIGRAM(S): 50 CAPSULE, EXTENDED RELEASE ORAL at 05:55

## 2019-09-24 NOTE — PROGRESS NOTE ADULT - SUBJECTIVE AND OBJECTIVE BOX
cc: abdominal pain  hpi: 54y female w/ pmh htn presents w/ abdominal pain for few days.  Points to epigastric region stating it's mostly there but sometimes radiates to ruq, worse w/ eating and associated w/ nausea, vomiting.  Denies fevers, no diarrhea.  Regular bm, good uop.   Pain significantly improved since arrival in ED.  Denies chest pain , sob, palp- no hx CAD, no DM.  Able to ambulate w/o cp or sob.     9/23- intermittent abd pain.   9/24- no pain.       Vital Signs Last 24 Hrs  T(C): 36.7 (24 Sep 2019 10:23), Max: 36.9 (23 Sep 2019 19:36)  T(F): 98 (24 Sep 2019 10:23), Max: 98.5 (23 Sep 2019 19:36)  HR: 80 (24 Sep 2019 10:23) (67 - 89)  BP: 140/72 (24 Sep 2019 10:23) (112/81 - 140/72)  BP(mean): --  RR: 18 (24 Sep 2019 10:23) (18 - 23)  SpO2: 97% (24 Sep 2019 10:23) (93% - 100%)      PHYSICAL EXAM:  General: NAD, comfortable  Neuro: AAOx3  HEENT: NCAT  Neck: Soft and supple  Respiratory: CTA b/l, no w/r/r  Cardiovascular: S1 and S2, RRR  Gastrointestinal: soft, non ttp   Extremities: No edema  Vascular: 2+ peripheral pulses  Musculoskeletal: 5/5 strength b/l UE and LE          LABS: All Labs Reviewed:                        10.6   7.14  )-----------( 209      ( 23 Sep 2019 07:09 )             33.1     09-23    142  |  109<H>  |  5<L>  ----------------------------<  108<H>  4.2   |  27  |  0.79    Ca    8.3<L>      23 Sep 2019 07:09    TPro  6.5  /  Alb  2.7<L>  /  TBili  0.4  /  DBili  0.1  /  AST  15  /  ALT  17  /  AlkPhos  68  09-23        < from: [facility] Hepatobiliary Imaging (09.21.19 @ 12:29) >  IMPRESSION: Abnormal morphine-augmented hepatobiliary scan: acute   cholecystitis.    Dr. Gavin, surgical resident was informed of these results by Dr. Patel with read back at about 12:30 pm on September 21, 2019.    < end of copied text >        MEDICATIONS  (STANDING):  dextrose 5% + sodium chloride 0.45% with potassium chloride 20 mEq/L 1000 milliLiter(s) (75 mL/Hr) IV Continuous <Continuous>  influenza   Vaccine 0.5 milliLiter(s) IntraMuscular once  pantoprazole    Tablet 40 milliGRAM(s) Oral before breakfast  piperacillin/tazobactam IVPB.. 3.375 Gram(s) IV Intermittent every 8 hours    MEDICATIONS  (PRN):  acetaminophen   Tablet .. 650 milliGRAM(s) Oral every 4 hours PRN Temp greater or equal to 38C (100.4F), Mild Pain (1 - 3)  ALBUTerol    90 MICROgram(s) HFA Inhaler 2 Puff(s) Inhalation every 6 hours PRN for shortness of breath and/or wheezing  morphine  - Injectable 2 milliGRAM(s) IV Push every 4 hours PRN Severe Pain (7 - 10)  ondansetron Injectable 4 milliGRAM(s) IV Push every 6 hours PRN Nausea  oxyCODONE    IR 10 milliGRAM(s) Oral every 4 hours PRN Moderate Pain (4 - 6)            Assessment and Recommendation:   		  1. acute cholecystitis s/p lap vielka  - management as per Surgery  - ivf, iv zosyn, pain control      2. htn   - resume home bp med     3. hx von willebrands disease  - Heme/Onc following -  ddavp prior to procedure    4. dvt px  - pt ambulating       Thank you for this consult.  We will follow with you.

## 2019-09-24 NOTE — PROGRESS NOTE ADULT - ASSESSMENT
A/P:  Acute gangrenous cholecystitis, s/p laparoscopic cholecystectomy  Von Willebrand Disease  Hematology on consult  IV antibiotics  Hospitalist on consult for medical management  GI/DVT prophylaxis  Pain control  Incentive spirometry  Serial abd exams  Adv diet as tolerated  Pt stable surgical standpoint  Pt will be monitored for signs of evolution/resolution of pathology and surgical intervention as required and warranted  Pt aware of and agrees with all of the above

## 2019-09-24 NOTE — PROGRESS NOTE ADULT - ASSESSMENT
53 y/o female admitted  with acute cholecystitis  History of von Willebrand disease- but no records available.    Hx of bleeding during prior surgeries- but did not need transfusions for surgical blood loss. Hx of PRBC transfusion for sever THOMAS due to menstrual blood loss.   Von Willebrand panel - VWF antigen abd VWF activity and Factor VIII  activity not deficient. Does not rule out mild form of VWD but seems sufficient for surgery.  S/p laparoscopic cholecystectomy 9/23 received DDAVP prior to surgery.  No excessive bleeding.  No need for additional DDAVP or factor replacement at this point.

## 2019-09-24 NOTE — PROGRESS NOTE ADULT - SUBJECTIVE AND OBJECTIVE BOX
CC:Patient is a 54y old  Female who presents with a chief complaint of Acute Cholecystitis (24 Sep 2019 12:15)      Subjective:  Pt seen and examined at bedside with chaperone. Pt is AAOx3, pt in no acute distress. Pt states tolerated abd incisional pain, Pt denied c/o fever, chills, chest pain, SOB, N/V/D, extremity pain or dysfunction, hemoptysis, hematemesis, hematuria, hematochexia, headache, diplopia, vertigo, dizzyness. Pt tolerating diet, (+) void, (+) ambulation, (+) bowel function    ROS:  abd incisional pain, otherwise as abovementioned ROS    Vital Signs Last 24 Hrs  T(C): 36.7 (24 Sep 2019 10:23), Max: 36.9 (23 Sep 2019 19:36)  T(F): 98 (24 Sep 2019 10:23), Max: 98.5 (23 Sep 2019 19:36)  HR: 80 (24 Sep 2019 10:23) (67 - 89)  BP: 140/72 (24 Sep 2019 10:23) (112/81 - 140/72)  BP(mean): --  RR: 18 (24 Sep 2019 10:23) (18 - 23)  SpO2: 97% (24 Sep 2019 10:23) (93% - 100%)    Labs:                                10.5   7.22  )-----------( 213      ( 24 Sep 2019 07:44 )             32.6     CBC Full  -  ( 24 Sep 2019 07:44 )  WBC Count : 7.22 K/uL  RBC Count : 3.95 M/uL  Hemoglobin : 10.5 g/dL  Hematocrit : 32.6 %  Platelet Count - Automated : 213 K/uL  Mean Cell Volume : 82.5 fl  Mean Cell Hemoglobin : 26.6 pg  Mean Cell Hemoglobin Concentration : 32.2 gm/dL  Auto Neutrophil # : x  Auto Lymphocyte # : x  Auto Monocyte # : x  Auto Eosinophil # : x  Auto Basophil # : x  Auto Neutrophil % : x  Auto Lymphocyte % : x  Auto Monocyte % : x  Auto Eosinophil % : x  Auto Basophil % : x    09-24    140  |  106  |  4<L>  ----------------------------<  105<H>  3.8   |  26  |  0.69    Ca    8.2<L>      24 Sep 2019 07:44    TPro  6.4  /  Alb  2.6<L>  /  TBili  0.5  /  DBili  x   /  AST  35  /  ALT  34  /  AlkPhos  66  09-24    LIVER FUNCTIONS - ( 24 Sep 2019 07:44 )  Alb: 2.6 g/dL / Pro: 6.4 gm/dL / ALK PHOS: 66 U/L / ALT: 34 U/L / AST: 35 U/L / GGT: x                 Meds:  acetaminophen   Tablet .. 650 milliGRAM(s) Oral every 4 hours PRN  ALBUTerol    90 MICROgram(s) HFA Inhaler 2 Puff(s) Inhalation every 6 hours PRN  dextrose 5% + sodium chloride 0.45% with potassium chloride 20 mEq/L 1000 milliLiter(s) IV Continuous <Continuous>  influenza   Vaccine 0.5 milliLiter(s) IntraMuscular once  morphine  - Injectable 2 milliGRAM(s) IV Push every 4 hours PRN  ondansetron Injectable 4 milliGRAM(s) IV Push every 6 hours PRN  oxyCODONE    IR 10 milliGRAM(s) Oral every 4 hours PRN  pantoprazole    Tablet 40 milliGRAM(s) Oral before breakfast  piperacillin/tazobactam IVPB.. 3.375 Gram(s) IV Intermittent every 8 hours      Radiology:      Physical exam:  Pt is aaox3  Pt in no acute distress  Psych: normal affect  Resp: CTAB  CVS: S1S2(+)  ABD: bowel sounds (+), soft, non distended, no rebound, no guarding, no rigidity, no skin changes to exam. Incision sites are clean, dry, intact, no cellulitis, no d/c, no purulence, no fluctuance. (+) appropriate incisional tenderness to exam  EXT: no calf tenderness or edema to exam b/l, on VTE prophylaxis  Skin: no adverse skin changes to exam, no jaundice or icteric sclera b/l

## 2019-09-24 NOTE — PROGRESS NOTE ADULT - SUBJECTIVE AND OBJECTIVE BOX
hpi: 54y female w/ pmh htn admitted with abdominal pain. Patient on surgery service, and plan for possible cholecystectomy.   Hematology consulted as patient reports history of von willibrands disease.   On further talking to patient she states that she was diagnosed with von willibrands disease about 7 years ago. Work up was actually initiated at the time because her son excessive nose bleeds and was diagnosed with vWD.  She had a few surgeries prior to diagnosis ( fibroid removal as well as some breast surgeries) with no excessive bleeding.   Post diagnosis of vwd she had a hysterectomy and was treated with 'something' but doesnt quite remember what.   Pateints medical care has been at [facility] in [place].   She has a h/o heavy menstrual bleeding  Patient has a h/o 2 blood transfusions due to heavy periods. No h/o transfusions related to surgeries  Currently patient denies chest pain , sob, palp- no hx CAD, no DM.  Able to ambulate w/o cp or sob.     9/23/19 No major changes. Afebrile. On antibiotics. pain is better. Feels hungry.  Coags w/up- von Willebrand panel - ordered by Dr Henry- pending  Results of VWD panel- activity of vWF and F VIII not deficient.  S/p laparoscopic cholecystectomy yest afternoon for gangrenous gall bladeder. Surgical blood loss  minimal. Received DDAVP prior to surgery    PAST MEDICAL & SURGICAL HISTORY:  htn, ?von willebrand disease  KAREN, appendectomy  Fibroids, Breast surgery    FAMILY HISTORY: son with vWD  Social History: no etoh or tobacco use    Vital Signs Last 24 Hrs  T(C): 36.9 (24 Sep 2019 16:28), Max: 36.9 (23 Sep 2019 19:36)  T(F): 98.4 (24 Sep 2019 16:28), Max: 98.5 (23 Sep 2019 19:36)  HR: 80 (24 Sep 2019 16:28) (67 - 89)  BP: 126/69 (24 Sep 2019 16:28) (112/81 - 140/72)  BP(mean): --  RR: 20 (24 Sep 2019 16:28) (18 - 20)  SpO2: 94% (24 Sep 2019 16:28) (93% - 100%)                        10.6   7.14  )-----------( 209      ( 23 Sep 2019 07:09 )             33.1                           10.5   7.22  )-----------( 213      ( 24 Sep 2019 07:44 )             32.6       von Willebrand Factor Antigen (09.21.19 @ 17:37)    von Willebrand Factor Antigen Result: 107: The reference range established by this laboratory is for patients of all  blood types.  There is variability due to blood  type (lower reference  values for type o).  The result is also affected by acute phase reaction  and should be interpreted in the context of the clinical setting for the  patient. The presence of rheumatoid factor >750 iu/ml may cause false  elevation of VWF antigen. %    von Willebrand Factor Activity (09.21.19 @ 17:37)    von Willebrand Factor Activity: 113: The reference range established by this laboratory is for patients of all  blood types.  There is variability due to blood type (lower reference  values for type O).  The result is also affected by acute phase reaction  and should be interpreted in the context of the clinical setting for the  patient. The presence of rheumatoid factor >200 IU/mL may cause false  elevation of VWF activity. %    Factor VIII Assay (09.21.19 @ 17:37)    Factor VIII Assay: 101: The presence of direct thrombin inhibitors (argatroban, refludan) may  falsely decrease activity. %    MEDICATIONS  (STANDING):  influenza   Vaccine 0.5 milliLiter(s) IntraMuscular once  pantoprazole    Tablet 40 milliGRAM(s) Oral before breakfast  piperacillin/tazobactam IVPB.. 3.375 Gram(s) IV Intermittent every 8 hours    MEDICATIONS  (PRN):  acetaminophen   Tablet .. 650 milliGRAM(s) Oral every 4 hours PRN Temp greater or equal to 38C (100.4F), Mild Pain (1 - 3)  ALBUTerol    0.083% 2.5 milliGRAM(s) Nebulizer every 6 hours PRN Wheezing  ALBUTerol    90 MICROgram(s) HFA Inhaler 2 Puff(s) Inhalation every 6 hours PRN for shortness of breath and/or wheezing  morphine  - Injectable 2 milliGRAM(s) IV Push every 4 hours PRN Severe Pain (7 - 10)  ondansetron Injectable 4 milliGRAM(s) IV Push every 6 hours PRN Nausea  oxyCODONE    IR 10 milliGRAM(s) Oral every 4 hours PRN Moderate Pain (4 - 6)

## 2019-09-25 ENCOUNTER — TRANSCRIPTION ENCOUNTER (OUTPATIENT)
Age: 54
End: 2019-09-25

## 2019-09-25 VITALS
HEART RATE: 72 BPM | SYSTOLIC BLOOD PRESSURE: 113 MMHG | DIASTOLIC BLOOD PRESSURE: 64 MMHG | RESPIRATION RATE: 16 BRPM | OXYGEN SATURATION: 95 % | TEMPERATURE: 98 F

## 2019-09-25 PROCEDURE — 99024 POSTOP FOLLOW-UP VISIT: CPT

## 2019-09-25 RX ORDER — DOCUSATE SODIUM 100 MG
1 CAPSULE ORAL
Qty: 0 | Refills: 0 | DISCHARGE

## 2019-09-25 RX ORDER — OXYCODONE HYDROCHLORIDE 5 MG/1
1 TABLET ORAL
Qty: 20 | Refills: 0
Start: 2019-09-25 | End: 2019-09-29

## 2019-09-25 RX ORDER — ALBUTEROL 90 UG/1
2 AEROSOL, METERED ORAL
Qty: 0 | Refills: 0 | DISCHARGE

## 2019-09-25 RX ORDER — PANTOPRAZOLE SODIUM 20 MG/1
1 TABLET, DELAYED RELEASE ORAL
Qty: 0 | Refills: 0 | DISCHARGE
Start: 2019-09-25

## 2019-09-25 RX ORDER — ACETAMINOPHEN 500 MG
2 TABLET ORAL
Qty: 0 | Refills: 0 | DISCHARGE
Start: 2019-09-25

## 2019-09-25 RX ORDER — OXYCODONE HYDROCHLORIDE 5 MG/1
1 TABLET ORAL
Qty: 0 | Refills: 0 | DISCHARGE
Start: 2019-09-25

## 2019-09-25 RX ORDER — PANTOPRAZOLE SODIUM 20 MG/1
1 TABLET, DELAYED RELEASE ORAL
Qty: 14 | Refills: 0
Start: 2019-09-25 | End: 2019-10-08

## 2019-09-25 RX ADMIN — PANTOPRAZOLE SODIUM 40 MILLIGRAM(S): 20 TABLET, DELAYED RELEASE ORAL at 05:38

## 2019-09-25 RX ADMIN — PIPERACILLIN AND TAZOBACTAM 25 GRAM(S): 4; .5 INJECTION, POWDER, LYOPHILIZED, FOR SOLUTION INTRAVENOUS at 05:38

## 2019-09-25 NOTE — DISCHARGE NOTE PROVIDER - CARE PROVIDER_API CALL
PMD, Heme oncology,   Phone: (   )    -  Fax: (   )    -  Follow Up Time: 1 week    Lenora Rogel)  Surgery; Surgical Critical Care  [address] [facility], [address] [place] [address]  Phone: [phone]  Fax: [phone]  Follow Up Time: 2 weeks

## 2019-09-25 NOTE — DISCHARGE NOTE PROVIDER - HOSPITAL COURSE
Pt with acute cholecystitis and Won Willebrand disease, assay WNL, mild disease cannot be ruled out, was given DDAVP pre op. S/p lap cholecystectomy, doing well, tolerating diet, no fever, ambulating, no more need for DDAVP per heme. Drain removed.

## 2019-09-25 NOTE — DISCHARGE NOTE PROVIDER - NSDCCPCAREPLAN_GEN_ALL_CORE_FT
PRINCIPAL DISCHARGE DIAGNOSIS  Diagnosis: Acute cholecystitis  Assessment and Plan of Treatment: S/P lap cholecystectomy

## 2019-09-25 NOTE — PROGRESS NOTE ADULT - REASON FOR ADMISSION
Acute Cholecystitis

## 2019-09-25 NOTE — DISCHARGE NOTE NURSING/CASE MANAGEMENT/SOCIAL WORK - PATIENT PORTAL LINK FT
You can access the FollowMyHealth Patient Portal offered by [facility] by registering at the following website: [email or web address]. By joining [employer]’s FollowMyHealth portal, you will also be able to view your health information using other applications (apps) compatible with our system.

## 2019-09-25 NOTE — DISCHARGE NOTE PROVIDER - PROVIDER TOKENS
FREE:[LAST:[PMD, Heme oncology],PHONE:[(   )    -],FAX:[(   )    -],FOLLOWUP:[1 week]],PROVIDER:[TOKEN:[[id number]],FOLLOWUP:[2 weeks]]

## 2019-09-25 NOTE — PROGRESS NOTE ADULT - SUBJECTIVE AND OBJECTIVE BOX
cc: abdominal pain  hpi: 54y female w/ pmh htn presents w/ abdominal pain for few days.  Points to epigastric region stating it's mostly there but sometimes radiates to ruq, worse w/ eating and associated w/ nausea, vomiting.  Denies fevers, no diarrhea.  Regular bm, good uop.   Pain significantly improved since arrival in ED.  Denies chest pain , sob, palp- no hx CAD, no DM.  Able to ambulate w/o cp or sob.     9/23- intermittent abd pain.   9/24- no pain.   9/25-         Vital Signs Last 24 Hrs  T(C): 36.4 (25 Sep 2019 05:48), Max: 36.9 (24 Sep 2019 16:28)  T(F): 97.5 (25 Sep 2019 05:48), Max: 98.4 (24 Sep 2019 16:28)  HR: 63 (25 Sep 2019 05:48) (63 - 80)  BP: 142/87 (25 Sep 2019 05:48) (106/56 - 142/87)  BP(mean): --  RR: 20 (24 Sep 2019 16:28) (18 - 20)  SpO2: 100% (25 Sep 2019 05:48) (94% - 100%)  T(C): 36.4 (25 Sep 2019 05:48), Max: 36.9 (24 Sep 2019 16:28)      PHYSICAL EXAM:  General: NAD, comfortable  Neuro: AAOx3  HEENT: NCAT  Neck: Soft and supple  Respiratory: CTA b/l, no w/r/r  Cardiovascular: S1 and S2, RRR  Gastrointestinal: soft, non ttp   Extremities: No edema  Vascular: 2+ peripheral pulses  Musculoskeletal: 5/5 strength b/l UE and LE          LABS: All Labs Reviewed:                        10.5   7.22  )-----------( 213      ( 24 Sep 2019 07:44 )             32.6     09-24    140  |  106  |  4<L>  ----------------------------<  105<H>  3.8   |  26  |  0.69    Ca    8.2<L>      24 Sep 2019 07:44    TPro  6.4  /  Alb  2.6<L>  /  TBili  0.5  /  DBili  x   /  AST  35  /  ALT  34  /  AlkPhos  66  09-24          Culture - Body Fluid with Gram Stain (09.23.19 @ 11:58)    Gram Stain:   No polymorphonuclear cells seen  No organisms seen  by cytocentrifuge    Specimen Source: .Body Fluid BILE    Culture Results:   No growth to date.        MEDICATIONS  (STANDING):  influenza   Vaccine 0.5 milliLiter(s) IntraMuscular once  pantoprazole    Tablet 40 milliGRAM(s) Oral before breakfast  piperacillin/tazobactam IVPB.. 3.375 Gram(s) IV Intermittent every 8 hours    MEDICATIONS  (PRN):  acetaminophen   Tablet .. 650 milliGRAM(s) Oral every 4 hours PRN Temp greater or equal to 38C (100.4F), Mild Pain (1 - 3)  ALBUTerol    0.083% 2.5 milliGRAM(s) Nebulizer every 6 hours PRN Wheezing  ALBUTerol    90 MICROgram(s) HFA Inhaler 2 Puff(s) Inhalation every 6 hours PRN for shortness of breath and/or wheezing  morphine  - Injectable 2 milliGRAM(s) IV Push every 4 hours PRN Severe Pain (7 - 10)  ondansetron Injectable 4 milliGRAM(s) IV Push every 6 hours PRN Nausea  oxyCODONE    IR 10 milliGRAM(s) Oral every 4 hours PRN Moderate Pain (4 - 6)        Assessment and Recommendation:   		  1. acute cholecystitis s/p lap vielka  - management as per Surgery  - ivf, iv zosyn, pain control      2. htn   - resume home bp med     3. hx von willebrands disease  - Heme/Onc following -  ddavp prior to procedure    4. dvt px  - pt ambulating       Thank you for this consult.  We will follow with you. cc: abdominal pain  hpi: 54y female w/ pmh htn presents w/ abdominal pain for few days.  Points to epigastric region stating it's mostly there but sometimes radiates to ruq, worse w/ eating and associated w/ nausea, vomiting.  Denies fevers, no diarrhea.  Regular bm, good uop.   Pain significantly improved since arrival in ED.  Denies chest pain , sob, palp- no hx CAD, no DM.  Able to ambulate w/o cp or sob.     9/23- intermittent abd pain.   9/24- no pain.   9/25- no pain.  d/c planning.        Vital Signs Last 24 Hrs  T(C): 36.4 (25 Sep 2019 05:48), Max: 36.9 (24 Sep 2019 16:28)  T(F): 97.5 (25 Sep 2019 05:48), Max: 98.4 (24 Sep 2019 16:28)  HR: 63 (25 Sep 2019 05:48) (63 - 80)  BP: 142/87 (25 Sep 2019 05:48) (106/56 - 142/87)  BP(mean): --  RR: 20 (24 Sep 2019 16:28) (18 - 20)  SpO2: 100% (25 Sep 2019 05:48) (94% - 100%)  T(C): 36.4 (25 Sep 2019 05:48), Max: 36.9 (24 Sep 2019 16:28)      PHYSICAL EXAM:  General: NAD, comfortable  Neuro: AAOx3  HEENT: NCAT  Neck: Soft and supple  Respiratory: CTA b/l, no w/r/r  Cardiovascular: S1 and S2, RRR  Gastrointestinal: soft, non ttp   Extremities: No edema  Vascular: 2+ peripheral pulses  Musculoskeletal: 5/5 strength b/l UE and LE          LABS: All Labs Reviewed:                        10.5   7.22  )-----------( 213      ( 24 Sep 2019 07:44 )             32.6     09-24    140  |  106  |  4<L>  ----------------------------<  105<H>  3.8   |  26  |  0.69    Ca    8.2<L>      24 Sep 2019 07:44    TPro  6.4  /  Alb  2.6<L>  /  TBili  0.5  /  DBili  x   /  AST  35  /  ALT  34  /  AlkPhos  66  09-24          Culture - Body Fluid with Gram Stain (09.23.19 @ 11:58)    Gram Stain:   No polymorphonuclear cells seen  No organisms seen  by cytocentrifuge    Specimen Source: .Body Fluid BILE    Culture Results:   No growth to date.        MEDICATIONS  (STANDING):  influenza   Vaccine 0.5 milliLiter(s) IntraMuscular once  pantoprazole    Tablet 40 milliGRAM(s) Oral before breakfast  piperacillin/tazobactam IVPB.. 3.375 Gram(s) IV Intermittent every 8 hours    MEDICATIONS  (PRN):  acetaminophen   Tablet .. 650 milliGRAM(s) Oral every 4 hours PRN Temp greater or equal to 38C (100.4F), Mild Pain (1 - 3)  ALBUTerol    0.083% 2.5 milliGRAM(s) Nebulizer every 6 hours PRN Wheezing  ALBUTerol    90 MICROgram(s) HFA Inhaler 2 Puff(s) Inhalation every 6 hours PRN for shortness of breath and/or wheezing  morphine  - Injectable 2 milliGRAM(s) IV Push every 4 hours PRN Severe Pain (7 - 10)  ondansetron Injectable 4 milliGRAM(s) IV Push every 6 hours PRN Nausea  oxyCODONE    IR 10 milliGRAM(s) Oral every 4 hours PRN Moderate Pain (4 - 6)        Assessment and Recommendation:   		  1. acute cholecystitis s/p lap vielka  - management as per Surgery  - ivf, iv zosyn, pain control      2. htn   - resume home bp med     3. hx von willebrands disease  - Heme/Onc following -  ddavp prior to procedure    4. dvt px  - pt ambulating       Thank you for this consult.  We will follow with you.

## 2019-09-25 NOTE — DISCHARGE NOTE PROVIDER - NSDCACTIVITY_GEN_ALL_CORE
No heavy lifting/straining/Stairs allowed/Showering allowed/Walking - Outdoors allowed/Walking - Indoors allowed/Do not drive or operate machinery

## 2019-09-25 NOTE — DISCHARGE NOTE PROVIDER - NSDCFUADDINST_GEN_ALL_CORE_FT
Seek medical attention if develop fever, nausea, vomiting, pain not controlled with medication any change/ drainage from  incision site. No heavy lifting, gym, exercise for 2 weeks. Regular walking and stairs are allowed if pain is controlled. Shower only for 2 weeks. Leave steri strips on. No driving for 1 week or later  if taking oxycodone for pain. Diet as tolerated. Cover drain site with gauze and tape. Call office for follow up appointment, in 10-14 days.

## 2019-09-27 LAB — VWF CBA/VWF AG PPP IA-RTO: SIGNIFICANT CHANGE UP

## 2019-10-14 PROBLEM — I10 ESSENTIAL (PRIMARY) HYPERTENSION: Chronic | Status: ACTIVE | Noted: 2019-09-20

## 2019-10-14 PROBLEM — D68.0 VON WILLEBRAND DISEASE: Chronic | Status: ACTIVE | Noted: 2019-09-20

## 2019-10-14 PROBLEM — I10 ESSENTIAL (PRIMARY) HYPERTENSION: Chronic | Status: ACTIVE | Noted: 2019-09-24

## 2019-10-16 PROBLEM — Z00.00 ENCOUNTER FOR PREVENTIVE HEALTH EXAMINATION: Status: ACTIVE | Noted: 2019-10-16

## 2019-10-18 ENCOUNTER — APPOINTMENT (OUTPATIENT)
Dept: SURGERY | Facility: CLINIC | Age: 54
End: 2019-10-18

## 2019-10-18 VITALS
DIASTOLIC BLOOD PRESSURE: 90 MMHG | HEIGHT: 63 IN | OXYGEN SATURATION: 97 % | BODY MASS INDEX: 47.31 KG/M2 | TEMPERATURE: 98 F | SYSTOLIC BLOOD PRESSURE: 143 MMHG | HEART RATE: 74 BPM | WEIGHT: 267 LBS

## 2020-01-29 NOTE — PATIENT PROFILE ADULT - FUNCTIONAL SCREEN CURRENT LEVEL: COMMUNICATION, MLM
Epidural Block  Date/Time: 1/28/2020 8:24 PM  Performed by: Zeke Cristina M.D.  Authorized by: Zeke Cristina M.D.     Patient Location:  OB  Start Time:  1/28/2020 8:24 PM  Reason for Block: labor analgesia    patient identified, IV checked, site marked, risks and benefits discussed, surgical consent, monitors and equipment checked, pre-op evaluation and timeout performed    Patient Position:  Sitting  Prep: ChloraPrep, patient draped and sterile technique    Monitoring:  Blood pressure, continuous pulse oximetry and heart rate  Approach:  Midline  Location:  L4-L5  Injection Technique:  ERAN saline  Skin infiltration:  Lidocaine  Strength:  1%  Dose:  3ml  Needle Type:  Tuohy  Needle Gauge:  17 G  Needle Length:  3.5 in  Loss of resistance::  6  Catheter Size:  19 G  Catheter at Skin Depth:  11  Test Dose:  Lidocaine 1.5% with epinephrine 1-to-200,000  Test Dose Result:  Negative   Easy x 1         0 = understands/communicates without difficulty

## 2020-06-11 NOTE — ED ADULT TRIAGE NOTE - PAIN RATING/NUMBER SCALE (0-10): ACTIVITY
Instructions: This plan will send the code FBSE to the PM system.  DO NOT or CHANGE the price. Detail Level: Generalized Price (Do Not Change): 0.00 8

## 2022-01-21 NOTE — DISCHARGE NOTE PROVIDER - NS AS DC PROVIDER CONTACT Y/N MULTI
Preparing for Your Surgery      Name:  Kody Reynaga   MRN:  [id number]   :  1947   Today's Date:  2022       Arriving for surgery:  Surgery date:  22  Arrival time:  01:00 p.m.    Restrictions due to COVID 19       Effective 22 [facility] is implementing the following visitor policy:     1 person may accompany the patient through the Pre-Op process.      Then that person will be asked to leave the building.        There will be no visitors in the Surgery Waiting Room.        Inpatients are allowed 1 consistent visitor for the duration of their stay.      Visitors must wear a mask.      Visitors must not be ill.      Visiting hours are 8 am to 8 pm.    [place] parking is available for anyone with mobility limitations or disabilities.  ([facility]  24 hours/ 7 days a week; [facility]  7 am- 3:30 pm, Mon- Fri)    Please come to:     [facility] [address]  [address] [place] [address]    - ? parking is available in front of the hospital      -    Please proceed to [address] on the 3rd floor. [phone]?     - ?If you are in need of directions, wheelchair or escort please stop at the Information Desk in the lobby.  Inform the information person that you are here for surgery; a wheelchair and escort to [address] will be provided.?     What can I eat or drink?  -  You may eat and drink normally for up to 8 hours before your surgery. (Until midnight the night before your surgery)  -  You may have clear liquids until 2 hours before surgery. (Until 01:00 p.m.)    Examples of clear liquids:  Water  Clear broth  Juices (apple, white grape, white cranberry  and cider) without pulp  Noncarbonated, powder based beverages  (lemonade and Williams-Aid)  Sodas (Sprite, 7-Up, ginger ale and seltzer)  Coffee or tea (without milk or cream)  Gatorade    -  No Alcohol for at least 24 hours before surgery     Which medicines can I take?    Hold  Aspirin for 7 days before surgery.   Hold Multivitamins for 7 days before surgery.  Hold Supplements for 7 days before surgery.  Hold Ibuprofen (Advil, Motrin) for 1 day before surgery--unless otherwise directed by surgeon.  Hold Naproxen (Aleve) for 4 days before surgery.    -  DO NOT take these medications the day of surgery:  Losartan (Cozaar)    -  PLEASE TAKE these medications the day of surgery:  Dicyclomine (Bentyl), Escitalopram (Lexapro), Loperamide (Imodium) if needed,   Lovastatin (Mevacor), Omeprazole (Prilosec)    How do I prepare myself?  - Please take 2 showers before surgery using Scrubcare or Hibiclens soap.    Use this soap only from the neck to your toes.     Leave the soap on your skin for one minute--then rinse thoroughly.      You may use your own shampoo and conditioner; no other hair products.   - Please remove all jewelry and body piercings.  - No lotions, deodorants or fragrance.  - No makeup or fingernail polish.   - Bring your ID and insurance card.    -If you have a Deep Brain Stimulator, Spinal Cord Stimulator or any neuro stimulator device---you must bring the remote control to the hospital     - All patients are required to have a Covid-19 test within 4 days of surgery/procedure.      -Patients will be contacted by the [facility] scheduling team within 1 week of surgery to make an appointment.      - Patients may call the Scheduling team at [phone] if they have not been scheduled within 4 days of  surgery.      ALL PATIENTS GOING HOME THE SAME DAY OF SURGERY ARE REQUIRED TO HAVE A RESPONSIBLE ADULT TO DRIVE AND BE IN ATTENDANCE WITH THEM FOR 24 HOURS FOLLOWING SURGERY.      Questions or Concerns:    - For any questions regarding the day of surgery or your hospital stay, please contact the Pre Admission Nursing Office at [phone].       - If you have health changes between today and your surgery please call your surgeon.       For questions after surgery please call  your surgeons office.              Yes

## 2025-03-06 NOTE — ED STATDOCS - CADM POA CENTRAL LINE
Prakash Treviño is a 89 y.o. male who presents for follow up of   Chief Complaint   Patient presents with    Follow-up    Anemia       The patient reports no new clinical symptoms or new complaints since last clinic evaluation.       No interval hospitalizations reported    No interval surgery or procedures reported    No reported new medication changes reported       Medications reviewed with the patient, and chart updated to reflect changes.         stores should now be improved.  - got IV iron with injectafer on 1/21/25    - last EPO on 1/15/25    # High risk medication monitoring  - EPO 40,000 U every 2 weeks.     EPO is generally safe and well-tolerated. However, there are some risks associated with its use, including:  Increased risk of blood clots: EPO can increase the risk of blood clots, especially in patients who are already at high risk.  Increased risk of stroke: EPO can increase the risk of stroke, especially in patients who have high blood pressure or other cardiovascular risk factors.  Increased risk of heart attack: EPO can increase the risk of heart attack, especially in patients who have coronary artery disease.  Worsening of high blood pressure: EPO can worsen high blood pressure.  Seizures: EPO can increase the risk of seizures in patients who have epilepsy or other seizure disorders.  Allergic reactions: EPO can cause allergic reactions in some patients.    The above risks were reviewed, ok to proceed with EPO administration.             Plan:     Follow labs, CBC, CMP, SBB, iron studies  Follow-up in clinic in 3 months to ensure improvement of iron stores and correction of severe anemia      Signed By: MD Valentin Palafox MD    Attending Medical Oncologist   [facility]         No